# Patient Record
Sex: FEMALE | Race: WHITE | Employment: UNEMPLOYED | URBAN - METROPOLITAN AREA
[De-identification: names, ages, dates, MRNs, and addresses within clinical notes are randomized per-mention and may not be internally consistent; named-entity substitution may affect disease eponyms.]

---

## 2017-01-19 PROBLEM — N39.0 URINARY TRACT INFECTION WITHOUT HEMATURIA: Status: ACTIVE | Noted: 2017-01-19

## 2017-03-08 PROBLEM — Q64.79 STRUCTURAL ABNORMALITY OF BLADDER: Status: ACTIVE | Noted: 2017-03-08

## 2017-03-08 PROBLEM — N94.9 VAGINAL DISCOMFORT: Status: ACTIVE | Noted: 2017-03-08

## 2017-08-30 PROBLEM — N39.0 RECURRENT UTI: Status: ACTIVE | Noted: 2017-08-30

## 2019-03-06 PROBLEM — F02.80 LATE ONSET ALZHEIMER'S DISEASE WITHOUT BEHAVIORAL DISTURBANCE (HCC): Status: ACTIVE | Noted: 2017-05-22

## 2019-03-06 PROBLEM — G30.1 LATE ONSET ALZHEIMER'S DISEASE WITHOUT BEHAVIORAL DISTURBANCE (HCC): Status: ACTIVE | Noted: 2017-05-22

## 2019-03-06 PROBLEM — K21.9 GERD (GASTROESOPHAGEAL REFLUX DISEASE): Status: ACTIVE | Noted: 2019-03-06

## 2019-03-06 PROBLEM — I50.32 CHRONIC DIASTOLIC (CONGESTIVE) HEART FAILURE (HCC): Status: ACTIVE | Noted: 2019-02-04

## 2019-03-06 PROBLEM — R26.9 GAIT ABNORMALITY: Status: ACTIVE | Noted: 2019-02-25

## 2022-03-18 PROBLEM — I50.32 CHRONIC DIASTOLIC (CONGESTIVE) HEART FAILURE (HCC): Status: ACTIVE | Noted: 2019-02-04

## 2022-03-18 PROBLEM — Q64.79 STRUCTURAL ABNORMALITY OF BLADDER: Status: ACTIVE | Noted: 2017-03-08

## 2022-03-18 PROBLEM — G30.1 LATE ONSET ALZHEIMER'S DISEASE WITHOUT BEHAVIORAL DISTURBANCE (HCC): Status: ACTIVE | Noted: 2017-05-22

## 2022-03-18 PROBLEM — F02.80 LATE ONSET ALZHEIMER'S DISEASE WITHOUT BEHAVIORAL DISTURBANCE (HCC): Status: ACTIVE | Noted: 2017-05-22

## 2022-03-18 PROBLEM — K21.9 GERD (GASTROESOPHAGEAL REFLUX DISEASE): Status: ACTIVE | Noted: 2019-03-06

## 2022-03-18 PROBLEM — N39.0 URINARY TRACT INFECTION WITHOUT HEMATURIA: Status: ACTIVE | Noted: 2017-01-19

## 2022-03-19 PROBLEM — R26.9 GAIT ABNORMALITY: Status: ACTIVE | Noted: 2019-02-25

## 2022-03-19 PROBLEM — N39.0 RECURRENT UTI: Status: ACTIVE | Noted: 2017-08-30

## 2022-03-20 PROBLEM — N94.9 VAGINAL DISCOMFORT: Status: ACTIVE | Noted: 2017-03-08

## 2022-05-28 ENCOUNTER — HOSPITAL ENCOUNTER (INPATIENT)
Age: 87
LOS: 4 days | Discharge: NURSING, REHAB OR LONG-TERM CARE | DRG: 377 | End: 2022-06-03
Attending: EMERGENCY MEDICINE | Admitting: STUDENT IN AN ORGANIZED HEALTH CARE EDUCATION/TRAINING PROGRAM
Payer: MEDICARE

## 2022-05-28 ENCOUNTER — HOSPITAL ENCOUNTER (EMERGENCY)
Age: 87
Discharge: NURSING, REHAB OR LONG-TERM CARE | DRG: 377 | End: 2022-05-28
Attending: EMERGENCY MEDICINE
Payer: MEDICARE

## 2022-05-28 ENCOUNTER — APPOINTMENT (OUTPATIENT)
Dept: GENERAL RADIOLOGY | Age: 87
DRG: 377 | End: 2022-05-28
Attending: EMERGENCY MEDICINE
Payer: MEDICARE

## 2022-05-28 VITALS
TEMPERATURE: 96.6 F | WEIGHT: 196.87 LBS | RESPIRATION RATE: 24 BRPM | OXYGEN SATURATION: 98 % | BODY MASS INDEX: 31.78 KG/M2 | DIASTOLIC BLOOD PRESSURE: 77 MMHG | SYSTOLIC BLOOD PRESSURE: 160 MMHG | HEART RATE: 92 BPM

## 2022-05-28 DIAGNOSIS — K92.2 GASTROINTESTINAL HEMORRHAGE, UNSPECIFIED GASTROINTESTINAL HEMORRHAGE TYPE: Primary | ICD-10-CM

## 2022-05-28 DIAGNOSIS — D64.9 ANEMIA, UNSPECIFIED TYPE: ICD-10-CM

## 2022-05-28 DIAGNOSIS — Z79.4 TYPE 2 DIABETES MELLITUS WITH HYPERGLYCEMIA, WITH LONG-TERM CURRENT USE OF INSULIN (HCC): ICD-10-CM

## 2022-05-28 DIAGNOSIS — E11.65 TYPE 2 DIABETES MELLITUS WITH HYPERGLYCEMIA, WITH LONG-TERM CURRENT USE OF INSULIN (HCC): ICD-10-CM

## 2022-05-28 LAB
ABO + RH BLD: NORMAL
ABO + RH BLD: NORMAL
ALBUMIN SERPL-MCNC: 1.9 G/DL (ref 3.5–5)
ALBUMIN SERPL-MCNC: 1.9 G/DL (ref 3.5–5)
ALBUMIN/GLOB SERPL: 0.4 {RATIO} (ref 1.1–2.2)
ALBUMIN/GLOB SERPL: 0.5 {RATIO} (ref 1.1–2.2)
ALP SERPL-CCNC: 85 U/L (ref 45–117)
ALP SERPL-CCNC: 88 U/L (ref 45–117)
ALT SERPL-CCNC: 17 U/L (ref 12–78)
ALT SERPL-CCNC: 18 U/L (ref 12–78)
ANION GAP SERPL CALC-SCNC: 10 MMOL/L (ref 5–15)
ANION GAP SERPL CALC-SCNC: 9 MMOL/L (ref 5–15)
APTT PPP: 26.5 SEC (ref 22.1–31)
AST SERPL-CCNC: 16 U/L (ref 15–37)
AST SERPL-CCNC: 16 U/L (ref 15–37)
BASOPHILS # BLD: 0 K/UL (ref 0–0.1)
BASOPHILS # BLD: 0 K/UL (ref 0–0.1)
BASOPHILS NFR BLD: 0 % (ref 0–1)
BASOPHILS NFR BLD: 0 % (ref 0–1)
BILIRUB SERPL-MCNC: 1.2 MG/DL (ref 0.2–1)
BILIRUB SERPL-MCNC: 1.2 MG/DL (ref 0.2–1)
BLOOD GROUP ANTIBODIES SERPL: NORMAL
BLOOD GROUP ANTIBODIES SERPL: NORMAL
BUN SERPL-MCNC: 56 MG/DL (ref 6–20)
BUN SERPL-MCNC: 61 MG/DL (ref 6–20)
BUN/CREAT SERPL: 37 (ref 12–20)
BUN/CREAT SERPL: 37 (ref 12–20)
CALCIUM SERPL-MCNC: 8.6 MG/DL (ref 8.5–10.1)
CALCIUM SERPL-MCNC: 8.6 MG/DL (ref 8.5–10.1)
CALCULATED R AXIS, ECG10: 1 DEGREES
CALCULATED T AXIS, ECG11: -78 DEGREES
CHLORIDE SERPL-SCNC: 106 MMOL/L (ref 97–108)
CHLORIDE SERPL-SCNC: 107 MMOL/L (ref 97–108)
CO2 SERPL-SCNC: 24 MMOL/L (ref 21–32)
CO2 SERPL-SCNC: 24 MMOL/L (ref 21–32)
COMMENT, HOLDF: NORMAL
COMMENT, HOLDF: NORMAL
CREAT SERPL-MCNC: 1.5 MG/DL (ref 0.55–1.02)
CREAT SERPL-MCNC: 1.65 MG/DL (ref 0.55–1.02)
DIAGNOSIS, 93000: NORMAL
DIFFERENTIAL METHOD BLD: ABNORMAL
DIFFERENTIAL METHOD BLD: ABNORMAL
EOSINOPHIL # BLD: 0 K/UL (ref 0–0.4)
EOSINOPHIL # BLD: 0 K/UL (ref 0–0.4)
EOSINOPHIL NFR BLD: 0 % (ref 0–7)
EOSINOPHIL NFR BLD: 0 % (ref 0–7)
ERYTHROCYTE [DISTWIDTH] IN BLOOD BY AUTOMATED COUNT: 15.2 % (ref 11.5–14.5)
ERYTHROCYTE [DISTWIDTH] IN BLOOD BY AUTOMATED COUNT: 15.3 % (ref 11.5–14.5)
ERYTHROCYTE [DISTWIDTH] IN BLOOD BY AUTOMATED COUNT: 15.3 % (ref 11.5–14.5)
GLOBULIN SER CALC-MCNC: 4 G/DL (ref 2–4)
GLOBULIN SER CALC-MCNC: 4.5 G/DL (ref 2–4)
GLUCOSE BLD STRIP.AUTO-MCNC: 206 MG/DL (ref 65–117)
GLUCOSE SERPL-MCNC: 132 MG/DL (ref 65–100)
GLUCOSE SERPL-MCNC: 211 MG/DL (ref 65–100)
HCT VFR BLD AUTO: 29.2 % (ref 35–47)
HCT VFR BLD AUTO: 30 % (ref 35–47)
HCT VFR BLD AUTO: 30.4 % (ref 35–47)
HGB BLD-MCNC: 9 G/DL (ref 11.5–16)
HGB BLD-MCNC: 9.5 G/DL (ref 11.5–16)
HGB BLD-MCNC: 9.6 G/DL (ref 11.5–16)
IMM GRANULOCYTES # BLD AUTO: 0.2 K/UL (ref 0–0.04)
IMM GRANULOCYTES # BLD AUTO: 0.2 K/UL (ref 0–0.04)
IMM GRANULOCYTES NFR BLD AUTO: 1 % (ref 0–0.5)
IMM GRANULOCYTES NFR BLD AUTO: 1 % (ref 0–0.5)
INR PPP: 1.2 (ref 0.9–1.1)
INR PPP: 1.2 (ref 0.9–1.1)
LYMPHOCYTES # BLD: 0.5 K/UL (ref 0.8–3.5)
LYMPHOCYTES # BLD: 0.6 K/UL (ref 0.8–3.5)
LYMPHOCYTES NFR BLD: 3 % (ref 12–49)
LYMPHOCYTES NFR BLD: 4 % (ref 12–49)
MAGNESIUM SERPL-MCNC: 1.7 MG/DL (ref 1.6–2.4)
MCH RBC QN AUTO: 28 PG (ref 26–34)
MCH RBC QN AUTO: 28.5 PG (ref 26–34)
MCH RBC QN AUTO: 28.6 PG (ref 26–34)
MCHC RBC AUTO-ENTMCNC: 30.8 G/DL (ref 30–36.5)
MCHC RBC AUTO-ENTMCNC: 31.6 G/DL (ref 30–36.5)
MCHC RBC AUTO-ENTMCNC: 31.7 G/DL (ref 30–36.5)
MCV RBC AUTO: 90.1 FL (ref 80–99)
MCV RBC AUTO: 90.5 FL (ref 80–99)
MCV RBC AUTO: 90.7 FL (ref 80–99)
MONOCYTES # BLD: 0.8 K/UL (ref 0–1)
MONOCYTES # BLD: 0.9 K/UL (ref 0–1)
MONOCYTES NFR BLD: 5 % (ref 5–13)
MONOCYTES NFR BLD: 6 % (ref 5–13)
NEUTS SEG # BLD: 13.4 K/UL (ref 1.8–8)
NEUTS SEG # BLD: 14.2 K/UL (ref 1.8–8)
NEUTS SEG NFR BLD: 90 % (ref 32–75)
NEUTS SEG NFR BLD: 90 % (ref 32–75)
NRBC # BLD: 0.02 K/UL (ref 0–0.01)
NRBC # BLD: 0.02 K/UL (ref 0–0.01)
NRBC # BLD: 0.04 K/UL (ref 0–0.01)
NRBC BLD-RTO: 0.1 PER 100 WBC
NRBC BLD-RTO: 0.1 PER 100 WBC
NRBC BLD-RTO: 0.3 PER 100 WBC
PLATELET # BLD AUTO: 570 K/UL (ref 150–400)
PLATELET # BLD AUTO: 626 K/UL (ref 150–400)
PLATELET # BLD AUTO: 658 K/UL (ref 150–400)
PLATELET COMMENTS,PCOM: ABNORMAL
PMV BLD AUTO: 10.2 FL (ref 8.9–12.9)
PMV BLD AUTO: 10.3 FL (ref 8.9–12.9)
PMV BLD AUTO: 10.3 FL (ref 8.9–12.9)
POTASSIUM SERPL-SCNC: 3.2 MMOL/L (ref 3.5–5.1)
POTASSIUM SERPL-SCNC: 3.3 MMOL/L (ref 3.5–5.1)
PROT SERPL-MCNC: 5.9 G/DL (ref 6.4–8.2)
PROT SERPL-MCNC: 6.4 G/DL (ref 6.4–8.2)
PROTHROMBIN TIME: 12.1 SEC (ref 9–11.1)
PROTHROMBIN TIME: 12.4 SEC (ref 9–11.1)
Q-T INTERVAL, ECG07: 382 MS
QRS DURATION, ECG06: 86 MS
QTC CALCULATION (BEZET), ECG08: 472 MS
RBC # BLD AUTO: 3.22 M/UL (ref 3.8–5.2)
RBC # BLD AUTO: 3.33 M/UL (ref 3.8–5.2)
RBC # BLD AUTO: 3.36 M/UL (ref 3.8–5.2)
RBC MORPH BLD: ABNORMAL
SAMPLES BEING HELD,HOLD: NORMAL
SAMPLES BEING HELD,HOLD: NORMAL
SERVICE CMNT-IMP: ABNORMAL
SODIUM SERPL-SCNC: 139 MMOL/L (ref 136–145)
SODIUM SERPL-SCNC: 141 MMOL/L (ref 136–145)
SPECIMEN EXP DATE BLD: NORMAL
SPECIMEN EXP DATE BLD: NORMAL
THERAPEUTIC RANGE,PTTT: NORMAL SECS (ref 58–77)
VANCOMYCIN SERPL-MCNC: 8.1 UG/ML
VENTRICULAR RATE, ECG03: 92 BPM
WBC # BLD AUTO: 15 K/UL (ref 3.6–11)
WBC # BLD AUTO: 15.8 K/UL (ref 3.6–11)
WBC # BLD AUTO: 15.9 K/UL (ref 3.6–11)

## 2022-05-28 PROCEDURE — 80053 COMPREHEN METABOLIC PANEL: CPT

## 2022-05-28 PROCEDURE — 85027 COMPLETE CBC AUTOMATED: CPT

## 2022-05-28 PROCEDURE — 82962 GLUCOSE BLOOD TEST: CPT

## 2022-05-28 PROCEDURE — G0378 HOSPITAL OBSERVATION PER HR: HCPCS

## 2022-05-28 PROCEDURE — 99285 EMERGENCY DEPT VISIT HI MDM: CPT

## 2022-05-28 PROCEDURE — 96374 THER/PROPH/DIAG INJ IV PUSH: CPT

## 2022-05-28 PROCEDURE — 85610 PROTHROMBIN TIME: CPT

## 2022-05-28 PROCEDURE — 83735 ASSAY OF MAGNESIUM: CPT

## 2022-05-28 PROCEDURE — 86900 BLOOD TYPING SEROLOGIC ABO: CPT

## 2022-05-28 PROCEDURE — 93005 ELECTROCARDIOGRAM TRACING: CPT

## 2022-05-28 PROCEDURE — 85730 THROMBOPLASTIN TIME PARTIAL: CPT

## 2022-05-28 PROCEDURE — 96376 TX/PRO/DX INJ SAME DRUG ADON: CPT

## 2022-05-28 PROCEDURE — 85025 COMPLETE CBC W/AUTO DIFF WBC: CPT

## 2022-05-28 PROCEDURE — 80202 ASSAY OF VANCOMYCIN: CPT

## 2022-05-28 PROCEDURE — 36415 COLL VENOUS BLD VENIPUNCTURE: CPT

## 2022-05-28 PROCEDURE — 74011636637 HC RX REV CODE- 636/637: Performed by: STUDENT IN AN ORGANIZED HEALTH CARE EDUCATION/TRAINING PROGRAM

## 2022-05-28 PROCEDURE — 74011250636 HC RX REV CODE- 250/636: Performed by: STUDENT IN AN ORGANIZED HEALTH CARE EDUCATION/TRAINING PROGRAM

## 2022-05-28 PROCEDURE — 71045 X-RAY EXAM CHEST 1 VIEW: CPT

## 2022-05-28 RX ORDER — POLYETHYLENE GLYCOL 3350 17 G/17G
17 POWDER, FOR SOLUTION ORAL DAILY PRN
Status: DISCONTINUED | OUTPATIENT
Start: 2022-05-28 | End: 2022-06-03 | Stop reason: HOSPADM

## 2022-05-28 RX ORDER — POTASSIUM CHLORIDE 14.9 MG/ML
10 INJECTION INTRAVENOUS
Status: COMPLETED | OUTPATIENT
Start: 2022-05-28 | End: 2022-05-28

## 2022-05-28 RX ORDER — INSULIN LISPRO 100 [IU]/ML
INJECTION, SOLUTION INTRAVENOUS; SUBCUTANEOUS
Status: DISCONTINUED | OUTPATIENT
Start: 2022-05-28 | End: 2022-05-31

## 2022-05-28 RX ORDER — ACETAMINOPHEN 650 MG/1
650 SUPPOSITORY RECTAL
Status: DISCONTINUED | OUTPATIENT
Start: 2022-05-28 | End: 2022-06-03 | Stop reason: HOSPADM

## 2022-05-28 RX ORDER — ONDANSETRON 2 MG/ML
4 INJECTION INTRAMUSCULAR; INTRAVENOUS
Status: DISCONTINUED | OUTPATIENT
Start: 2022-05-28 | End: 2022-06-03 | Stop reason: HOSPADM

## 2022-05-28 RX ORDER — ACETAMINOPHEN 325 MG/1
650 TABLET ORAL
Status: DISCONTINUED | OUTPATIENT
Start: 2022-05-28 | End: 2022-06-03 | Stop reason: HOSPADM

## 2022-05-28 RX ORDER — MAGNESIUM SULFATE 100 %
4 CRYSTALS MISCELLANEOUS AS NEEDED
Status: DISCONTINUED | OUTPATIENT
Start: 2022-05-28 | End: 2022-06-03 | Stop reason: HOSPADM

## 2022-05-28 RX ORDER — SODIUM CHLORIDE 9 MG/ML
100 INJECTION, SOLUTION INTRAVENOUS CONTINUOUS
Status: DISCONTINUED | OUTPATIENT
Start: 2022-05-28 | End: 2022-06-02

## 2022-05-28 RX ORDER — SODIUM CHLORIDE 0.9 % (FLUSH) 0.9 %
5-40 SYRINGE (ML) INJECTION EVERY 8 HOURS
Status: DISCONTINUED | OUTPATIENT
Start: 2022-05-28 | End: 2022-06-03 | Stop reason: HOSPADM

## 2022-05-28 RX ORDER — HYDRALAZINE HYDROCHLORIDE 20 MG/ML
10 INJECTION INTRAMUSCULAR; INTRAVENOUS
Status: DISCONTINUED | OUTPATIENT
Start: 2022-05-28 | End: 2022-06-03 | Stop reason: HOSPADM

## 2022-05-28 RX ORDER — SODIUM CHLORIDE 0.9 % (FLUSH) 0.9 %
5-40 SYRINGE (ML) INJECTION AS NEEDED
Status: DISCONTINUED | OUTPATIENT
Start: 2022-05-28 | End: 2022-06-03 | Stop reason: HOSPADM

## 2022-05-28 RX ORDER — ONDANSETRON 4 MG/1
4 TABLET, ORALLY DISINTEGRATING ORAL
Status: DISCONTINUED | OUTPATIENT
Start: 2022-05-28 | End: 2022-06-03 | Stop reason: HOSPADM

## 2022-05-28 RX ADMIN — POTASSIUM CHLORIDE 10 MEQ: 14.9 INJECTION, SOLUTION INTRAVENOUS at 21:42

## 2022-05-28 RX ADMIN — SODIUM CHLORIDE 50 ML/HR: 9 INJECTION, SOLUTION INTRAVENOUS at 21:39

## 2022-05-28 RX ADMIN — Medication 2 UNITS: at 22:28

## 2022-05-28 RX ADMIN — POTASSIUM CHLORIDE 10 MEQ: 14.9 INJECTION, SOLUTION INTRAVENOUS at 20:22

## 2022-05-28 NOTE — ED TRIAGE NOTES
Pt returns to ED from [facility] after staff at facility said \"she had blood in her depends when she came back and that is what we sent her to the hospital for initially\".

## 2022-05-28 NOTE — DISCHARGE INSTRUCTIONS
You were seen today in the ER for an episode of rectal bleeding. Your hemoglobin level came back unremarkable at 10.5. A repeat hemoglobin level was 10.6. No signs of any active bleeding at this time. There are no Wet Read(s) to document.

## 2022-05-28 NOTE — PROGRESS NOTES
CM received call to set up stretcher transportation for patient to return to [facility]. [facility] ([facility] phone [phone]    Phoned [facility] when given ETA 1600. They do NOT have any earlier trips and have attempted to place calls with other companies. Writer called [facility]  [phone] Cherrie Barrera, they have availability at 1400. CM accepted the 1400 crew, [facility].     Luanne Hodgkin, RN, BSN, [facility]  ED Care Management  [phone]

## 2022-05-28 NOTE — ED TRIAGE NOTES
Patient sent from [facility] for rectal bleeding this morning. [facility] nurse reported a \"fist sized amount of coagulated blood at the patient's rectum. \" Patient is oriented to self in triage, does have diagnosis of alzheimers. Presents with bruising and dressing to right hip, patient had right hip replacement on 5/11/22 after a fall. Patient tested positive for COVID on 5/13/22.

## 2022-05-28 NOTE — ED PROVIDER NOTES
30-year-old female presents from [facility] rehab via EMS with a complaint of rectal bleeding. According to EMS, nursing home staff was changing patient this morning when he noticed a large blood clot in her diaper. No other evidence of active bleeding. Patient denies any pain at this time. She denies abdominal pain and rectal pain. No known history of GI bleed. Patient is in rehab following surgical repair of her right hip fracture and fracture to her right radius after ground-level fall. Her hospital course has been complicated by community acquired pneumonia for which she is currently on Zosyn and vancomycin as of May 25 via a PICC line in her right upper extremity. Patient also has a chronically indwelling Martin catheter. Her past medical history significant for CHF, diabetes, arthritis, recurrent UTIs, indwelling Martin catheter, dementia. According to records from [facility], patient takes a daily aspirin and is also on DVT prophylaxis with Lovenox. No other blood thinning medications noted. On further review of her records, she had a hemoglobin of 8.9 on 5/25. Past Medical History:   Diagnosis Date    Anxiety     CAD (coronary artery disease)     Diabetes mellitus ([facility] [address]     Dysuria     H/O joint replacement     HTN (hypertension)     Hypercholesteremia     Osteoarthritis     Recurrent UTI     Structural abnormality of bladder     Urinary tract infection without hematuria     site unspecified    UTI (urinary tract infection)        Past Surgical History:   Procedure Laterality Date    HX APPENDECTOMY      HX BACK SURGERY      HX CAROTID ENDARTERECTOMY      HX CATARACT REMOVAL      HX FEMUR FRACTURE TX      HX KNEE REPLACEMENT           History reviewed. No pertinent family history.     Social History     Socioeconomic History    Marital status:      Spouse name: Not on file    Number of children: Not on file    Years of education: Not on file    Highest education level: Not on file   Occupational History    Not on file   Tobacco Use    Smoking status: Never Smoker    Smokeless tobacco: Never Used   Vaping Use    Vaping Use: Never used   Substance and Sexual Activity    Alcohol use: No    Drug use: No    Sexual activity: Not on file   Other Topics Concern    Not on file   Social History Narrative    Not on file     Social Determinants of Health     Financial Resource Strain:     Difficulty of Paying Living Expenses: Not on file   Food Insecurity:     Worried About Running Out of Food in the Last Year: Not on file    Galo of Food in the Last Year: Not on file   Transportation Needs:     Lack of Transportation (Medical): Not on file    Lack of Transportation (Non-Medical): Not on file   Physical Activity:     Days of Exercise per Week: Not on file    Minutes of Exercise per Session: Not on file   Stress:     Feeling of Stress : Not on file   Social Connections:     Frequency of Communication with Friends and Family: Not on file    Frequency of Social Gatherings with Friends and Family: Not on file    Attends Baptism Services: Not on file    Active Member of [address] [place] [address] or Organizations: Not on file    Attends Club or Organization Meetings: Not on file    Marital Status: Not on file   Intimate Partner Violence:     Fear of Current or Ex-Partner: Not on file    Emotionally Abused: Not on file    Physically Abused: Not on file    Sexually Abused: Not on file   Housing Stability:     Unable to Pay for Housing in the Last Year: Not on file    Number of [place] in the Last Year: Not on file    Unstable Housing in the Last Year: Not on file         ALLERGIES: Latex and Iodinated contrast media    Review of Systems   Unable to perform ROS: Dementia       Vitals:    05/28/22 0737   Weight: 89.3 kg (196 lb 13.9 oz)            Physical Exam  Vitals and nursing note reviewed. Constitutional:       General: She is not in acute distress. Appearance: She is well-developed. Comments: Appears chronically debilitated. HENT:      Head: Normocephalic and atraumatic. Eyes:      General: No scleral icterus. Conjunctiva/sclera: Conjunctivae normal.      Pupils: Pupils are equal, round, and reactive to light. Cardiovascular:      Rate and Rhythm: Normal rate. Heart sounds: No murmur heard. Pulmonary:      Effort: Pulmonary effort is normal. No respiratory distress. Abdominal:      General: There is no distension. Genitourinary:     Comments: Martin catheter in place. Musculoskeletal:         General: Normal range of motion. Cervical back: Normal range of motion and neck supple. Comments: Healing surgical wound to the right hip with large ecchymosis extending down the back of the right thigh. Single port PICC line in the right upper extremity. Skin:     General: Skin is warm and dry. Findings: No rash. Neurological:      Mental Status: She is alert and oriented to person, place, and time. MDM  Number of Diagnoses or Management Options  Gastrointestinal hemorrhage, unspecified gastrointestinal hemorrhage type  Diagnosis management comments: Assessment: Patient was sent here for single episode of rectal bleeding. Vital signs are found to be stable with an elevated blood pressure. She had no further episodes of bleeding while in the emergency department. Her hemoglobin level came back at 9.5 which represented an improvement from her previous level as reported by the nursing home. A repeat hemoglobin was obtained in the ED which came back at 9.6. No signs of any significant bleeding at this time. And the patient is stable for discharge back to the nursing home to continue her routine care.        Amount and/or Complexity of Data Reviewed  Clinical lab tests: reviewed  Tests in the radiology section of CPT®: reviewed  Tests in the medicine section of CPT®: reviewed      ED Course as of 05/28/22 1516 Sat May 28, 2022   0920 EKG, 12 LEAD, INITIAL  ED EKG interpretation:  Rhythm: normal sinus rhythm. Rate (approx.): 92. Axis: normal.  ST segment:  No concerning ST elevations or depressions. This EKG was interpreted by Yunior Villalobos MD,ED Provider.      [JM]      ED Course User Index  [JM] Zunilda Amaro MD       Procedures

## 2022-05-28 NOTE — H&P
[facility] Adult  Hospitalist Group  History and Physical    Primary Care Provider: Nettie Broderick MD  Date of Service:  5/28/2022    Chief Complaint: Blood clots in diaper    Subjective:     Ash Dang is a 82 y.o. female with past medical history of anxiety, Alzheimer's dementia, hypertension, recent hip replacement right hip on 5/12/2022 at [facility], diabetes, UTI, being treated for pneumonia through a PICC line with vancomycin and Zosyn at inpatient rehab. Patient comes from [facility] rehab. She was here earlier this morning when they found her to have a substantially sized blood clot in her diaper. She was sent from [facility] due to concern for GI bleed. While here in the ER patient was hemodynamically stable and there were no signs of bleeding so she was monitored and sent back to rehab. Apparently later in the evening the patient presented with another blood clot in her diaper, so the people at [facility] sent her back due to concerns. Her hemoglobin has dropped from about 9.5 to about 9 since the morning. Patient is minimally participative and responsive which is near her baseline from her dementia, but family says that she is more participative usually. They do report that she was kept at [facility] for about 2 weeks and required 2 different transfusions while there. They also report that when she was discharged from [facility] she was discharged without a blood thinner but that she had been started on a blood thinner for apparent DVT prophylaxis at [facility] I am unable to get a review of systems from the patient due to her not responding to my questions. In the ER the patient has been hemodynamically stable, with chest x-ray redemonstrating pneumonia, and no signs of bleeding at this time. I spoke to her family on the telephone and they deny any known issues with fevers, complaints of chest pains, diarrhea, or any other symptoms aside from the bleeding.   She has not eaten anything since the morning. Review of Systems:    Unable to obtain accurate review of systems due to patient's functional status. Past Medical History:   Diagnosis Date    Anxiety     CAD (coronary artery disease)     Diabetes mellitus ([address]     Dysuria     H/O joint replacement     HTN (hypertension)     Hypercholesteremia     Osteoarthritis     Recurrent UTI     Structural abnormality of bladder     Urinary tract infection without hematuria     site unspecified    UTI (urinary tract infection)       Past Surgical History:   Procedure Laterality Date    HX APPENDECTOMY      HX BACK SURGERY      HX CAROTID ENDARTERECTOMY      HX CATARACT REMOVAL      HX FEMUR FRACTURE TX      HX KNEE REPLACEMENT       Prior to Admission medications    Medication Sig Start Date End Date Taking? Authorizing Provider   metFORMIN (GLUCOPHAGE) 500 mg tablet  7/4/18   Provider, Historical   rivastigmine tartrate (EXELON) 6 mg capsule 6 mg. 1/20/17   Provider, Historical   acetaminophen (TYLENOL) 325 mg tablet 650 mg. 10/18/16   Provider, Historical   amLODIPine (NORVASC) 10 mg tablet TAKE ONE TABLET BY MOUTH ONCE DAILY 2/23/16   Provider, Historical   aspirin delayed-release 81 mg tablet 81 mg.    Provider, Historical   atorvastatin (LIPITOR) 10 mg tablet TAKE ONE TABLET BY MOUTH EVERY NIGHT AT BEDTIME 12/28/15   Provider, Historical   carvedilol (COREG) 25 mg tablet TAKE ONE-HALF TABLET BY MOUTH TWICE DAILY 4/11/16   Provider, Historical   doxepin (SINEQUAN) 25 mg capsule 25 mg. 4/11/16   Provider, Historical   hydrALAZINE (APRESOLINE) 50 mg tablet 50 mg. 10/18/16   Provider, Historical   Insulin Lisp & Lisp Prot, Hum, (HUMALOG MIX 75-25 KWIKPEN) 100 unit/mL (75-25) inpn 10 Units. 3/23/16   Provider, Historical   omeprazole (PRILOSEC) 20 mg capsule 20 mg. 10/18/16   Provider, Historical   oxyCODONE-acetaminophen (PERCOCET) 5-325 mg per tablet Take 2 Tabs by Mouth Every 4 Hours As Needed for Pain. 10/18/16   Provider, Historical     Allergies   Allergen Reactions    Latex Unknown (comments) and Itching     Other reaction(s): itching    Iodinated Contrast Media Unknown (comments) and Hives      No family history on file. SOCIAL HISTORY:  Patient resides at inpatient rehab at this time. Patient ambulates with no ambulation. Smoking history: no  Alcohol history: no        Objective:       Physical Exam:   Physical Exam  Vitals reviewed. Constitutional:       General: She is not in acute distress. Appearance: She is not ill-appearing, toxic-appearing or diaphoretic. HENT:      Head: Normocephalic and atraumatic. Nose: Nose normal. No congestion or rhinorrhea. Mouth/Throat:      Mouth: Mucous membranes are moist.      Pharynx: Oropharynx is clear. No oropharyngeal exudate or posterior oropharyngeal erythema. Eyes:      General: No scleral icterus. Extraocular Movements: Extraocular movements intact. Pupils: Pupils are equal, round, and reactive to light. Cardiovascular:      Rate and Rhythm: Normal rate and regular rhythm. Pulses: Normal pulses. Heart sounds: Normal heart sounds. No murmur heard. No friction rub. No gallop. Pulmonary:      Effort: Pulmonary effort is normal. No respiratory distress. Breath sounds: No stridor. Rales (Diffuse) present. No wheezing or rhonchi. Abdominal:      General: Bowel sounds are normal. There is no distension. Palpations: Abdomen is soft. There is no mass. Tenderness: There is no abdominal tenderness. There is no guarding or rebound. Hernia: No hernia is present. Musculoskeletal:         General: Signs of injury (Right hip surgical area bandaged. Left lower extremity ecchymoses. ) present. Normal range of motion. Cervical back: Neck supple. No tenderness. Right lower leg: No edema. Left lower leg: No edema. Skin:     General: Skin is warm and dry.       Capillary Refill: Capillary refill takes less than 2 seconds. Coloration: Skin is pale. Skin is not jaundiced. Findings: Bruising present. No erythema, lesion or rash. Neurological:      Mental Status: She is disoriented. Motor: Weakness present. ECG: Atrial fibrillation    Data Review: All diagnostic labs and studies have been reviewed. Chest x-ray Pulmonary opacification. Assessment:     Active Problems:    GIB (gastrointestinal bleeding) (5/28/2022)    Apparent JOSE F, unknown if CKD    Pneumonia with right sided PICC    Alzheimer's dementia    Diabetes    Hypertension    Hypokalemia    Plan:       GIB (gastrointestinal bleeding) (5/28/2022)  H&H every 8 hours  Remote telemetry  N.p.o.  Hemodynamically stable at this time  Monitor vitals and labs  Transfuse if hemoglobin less than 7  I spoke with the family on the telephone and they do not want her to go through a colonoscopy or any other procedure that would be traumatic  If active bleeding, consider CTA  Reevaluate based on hemoglobin measurements  At this time I am not putting her on any blood thinner due to bleeding, will reevaluate in the morning. Anemia secondary to blood loss  As above    Apparent JOSE F, unknown if CKD  Gentle hydration  Check labs in the morning    Pneumonia with right sided PICC  Patient was being treated with vancomycin and Zosyn  Continue antibiotic treatment  Monitor    Alzheimer's dementia  Stable    Diabetes  Sliding scale insulin    Hypertension  Hydralazine IV as needed for blood pressure above 160 for now as patient is n.p.o. Hypokalemia  Giving IV potassium 20 mEq at this time  Recheck in the morning    FUNCTIONAL STATUS PRIOR TO HOSPITALIZATION and rehab due to hip fracture. Does not ambulate. No falls.       Signed By: Sunni Gibbs MD     May 28, 2022

## 2022-05-28 NOTE — ED NOTES
Patient came with a walls in from [facility]. Walls was not secured well and was not draining well. Will D/C walls and replace with same size 16 [place]. Pt cleansed; barrier cream applied to pt perineum area.

## 2022-05-28 NOTE — ED NOTES
[facility] at bedside for transport back to [facility]. SBAR given. Discharge papers given to [facility].

## 2022-05-28 NOTE — ED PROVIDER NOTES
The history is provided by medical records. The history is limited by the condition of the patient. Rectal Bleeding   This is a recurrent problem. The current episode started 6 to 12 hours ago. Pertinent negatives include no abdominal pain, no dysuria, no chills, no fever, no nausea, no back pain, no diarrhea and no constipation. She has tried nothing for the symptoms. The treatment provided no relief. Past Medical History:   Diagnosis Date    Anxiety     CAD (coronary artery disease)     Diabetes mellitus ([address]     Dysuria     H/O joint replacement     HTN (hypertension)     Hypercholesteremia     Osteoarthritis     Recurrent UTI     Structural abnormality of bladder     Urinary tract infection without hematuria     site unspecified    UTI (urinary tract infection)        Past Surgical History:   Procedure Laterality Date    HX APPENDECTOMY      HX BACK SURGERY      HX CAROTID ENDARTERECTOMY      HX CATARACT REMOVAL      HX FEMUR FRACTURE TX      HX KNEE REPLACEMENT           No family history on file. Social History     Socioeconomic History    Marital status:      Spouse name: Not on file    Number of children: Not on file    Years of education: Not on file    Highest education level: Not on file   Occupational History    Not on file   Tobacco Use    Smoking status: Never Smoker    Smokeless tobacco: Never Used   Vaping Use    Vaping Use: Never used   Substance and Sexual Activity    Alcohol use: No    Drug use: No    Sexual activity: Not on file   Other Topics Concern    Not on file   Social History Narrative    Not on file     Social Determinants of Health     Financial Resource Strain:     Difficulty of Paying Living Expenses: Not on file   Food Insecurity:     Worried About [address] in the Last Year: Not on file    Galo of Food in the Last Year: Not on file   Transportation Needs:     Lack of Transportation (Medical):  Not on file    Lack of Transportation (Non-Medical): Not on file   Physical Activity:     Days of Exercise per Week: Not on file    Minutes of Exercise per Session: Not on file   Stress:     Feeling of Stress : Not on file   Social Connections:     Frequency of Communication with Friends and Family: Not on file    Frequency of Social Gatherings with Friends and Family: Not on file    Attends Faith Services: Not on file    Active Member of [address] [place] [address] or Organizations: Not on file    Attends Club or Organization Meetings: Not on file    Marital Status: Not on file   Intimate Partner Violence:     Fear of Current or Ex-Partner: Not on file    Emotionally Abused: Not on file    Physically Abused: Not on file    Sexually Abused: Not on file   Housing Stability:     Unable to Pay for Housing in the Last Year: Not on file    Number of [place] in the Last Year: Not on file    Unstable Housing in the Last Year: Not on file         ALLERGIES: Latex and Iodinated contrast media    Review of Systems   Constitutional: Negative for activity change, chills and fever. HENT: Negative for nosebleeds, sore throat, trouble swallowing and voice change. Eyes: Negative for visual disturbance. Respiratory: Negative for shortness of breath. Cardiovascular: Negative for chest pain and palpitations. Gastrointestinal: Positive for anal bleeding. Negative for abdominal pain, constipation, diarrhea and nausea. Genitourinary: Negative for difficulty urinating, dysuria, hematuria and urgency. Musculoskeletal: Negative for back pain, neck pain and neck stiffness. Skin: Negative for color change. Allergic/Immunologic: Negative for immunocompromised state. Neurological: Negative for dizziness, seizures, syncope, weakness, light-headedness, numbness and headaches. Psychiatric/Behavioral: Negative for behavioral problems, confusion, hallucinations, self-injury and suicidal ideas.        Vitals:    05/28/22 1617   Temp: (!) (P) 96.6 °F (35.9 °C)            Physical Exam  Vitals and nursing note reviewed. Exam conducted with a chaperone present. Constitutional:       General: She is not in acute distress. Appearance: She is well-developed. She is not diaphoretic. HENT:      Head: Atraumatic. Neck:      Trachea: No tracheal deviation. Cardiovascular:      Comments: Warm and well perfused  Pulmonary:      Effort: Pulmonary effort is normal. No respiratory distress. Musculoskeletal:         General: Normal range of motion. Skin:     General: Skin is warm and dry. Neurological:      Mental Status: She is alert. Mental status is at baseline. Coordination: Coordination normal.          MDM     This is an 81-year-old female with past medical history, review of systems, physical exam as above, presenting with complaints of rectal bleeding. Patient recently underwent right hip replacement, recovering at a local nursing home. She is reportedly taking aspirin, as well as Lovenox for DVT prophylaxis. She was sent from her nursing home after they noted during a diaper change this morning that she had a blood clot, her hemoglobin was unchanged, and after discussions with her family she was referred back to her long-term care facility. Apparently later today further blood clots were noted, and she was sent back to the emergency department. Patient is noted to have a history of community-acquired pneumonia, with right upper extremity PICC line, indwelling Martin catheter, and history of dementia. Upon arrival she is awake, reportedly to be at baseline and unable to contribute to review of systems. She does have a large blood clot in her diaper, without active bleeding. We will reevaluate lab work, and I have attempted to reach her son by phone, with no answer, I have left a message. We will reassess, and make a disposition.     Procedures    Perfect Serve Consult for Admission  6:25 PM    ED Room Number: XM19/87  Patient Name and age:  Micah Singh 82 y.o.  female  Working Diagnosis:   1. Gastrointestinal hemorrhage, unspecified gastrointestinal hemorrhage type    2.  Anemia, unspecified type        COVID-19 Suspicion:  no  Sepsis present:  no  Reassessment needed: no  Code Status:  Do Not Resuscitate  Readmission: no  Isolation Requirements:  no  Recommended Level of Care:  med/surg  Department:[facility] Adult ED - 21   Other:  D/w Dr. Ashkan Ryan

## 2022-05-29 LAB
ALBUMIN SERPL-MCNC: 1.7 G/DL (ref 3.5–5)
ALBUMIN/GLOB SERPL: 0.5 {RATIO} (ref 1.1–2.2)
ALP SERPL-CCNC: 83 U/L (ref 45–117)
ALT SERPL-CCNC: 14 U/L (ref 12–78)
ANION GAP SERPL CALC-SCNC: 9 MMOL/L (ref 5–15)
AST SERPL-CCNC: 15 U/L (ref 15–37)
BASOPHILS # BLD: 0 K/UL (ref 0–0.1)
BASOPHILS NFR BLD: 0 % (ref 0–1)
BILIRUB SERPL-MCNC: 1 MG/DL (ref 0.2–1)
BUN SERPL-MCNC: 53 MG/DL (ref 6–20)
BUN/CREAT SERPL: 37 (ref 12–20)
CALCIUM SERPL-MCNC: 8.1 MG/DL (ref 8.5–10.1)
CHLORIDE SERPL-SCNC: 110 MMOL/L (ref 97–108)
CO2 SERPL-SCNC: 24 MMOL/L (ref 21–32)
CREAT SERPL-MCNC: 1.44 MG/DL (ref 0.55–1.02)
DIFFERENTIAL METHOD BLD: ABNORMAL
EOSINOPHIL # BLD: 0 K/UL (ref 0–0.4)
EOSINOPHIL NFR BLD: 0 % (ref 0–7)
ERYTHROCYTE [DISTWIDTH] IN BLOOD BY AUTOMATED COUNT: 15.4 % (ref 11.5–14.5)
GLOBULIN SER CALC-MCNC: 3.7 G/DL (ref 2–4)
GLUCOSE BLD STRIP.AUTO-MCNC: 181 MG/DL (ref 65–117)
GLUCOSE BLD STRIP.AUTO-MCNC: 187 MG/DL (ref 65–117)
GLUCOSE BLD STRIP.AUTO-MCNC: 188 MG/DL (ref 65–117)
GLUCOSE BLD STRIP.AUTO-MCNC: 203 MG/DL (ref 65–117)
GLUCOSE SERPL-MCNC: 194 MG/DL (ref 65–100)
HCT VFR BLD AUTO: 25.9 % (ref 35–47)
HCT VFR BLD AUTO: 27 % (ref 35–47)
HCT VFR BLD AUTO: 27.8 % (ref 35–47)
HGB BLD-MCNC: 7.7 G/DL (ref 11.5–16)
HGB BLD-MCNC: 8.2 G/DL (ref 11.5–16)
HGB BLD-MCNC: 8.6 G/DL (ref 11.5–16)
IMM GRANULOCYTES # BLD AUTO: 0.1 K/UL (ref 0–0.04)
IMM GRANULOCYTES NFR BLD AUTO: 1 % (ref 0–0.5)
LYMPHOCYTES # BLD: 0.7 K/UL (ref 0.8–3.5)
LYMPHOCYTES NFR BLD: 5 % (ref 12–49)
MCH RBC QN AUTO: 27.7 PG (ref 26–34)
MCHC RBC AUTO-ENTMCNC: 30.4 G/DL (ref 30–36.5)
MCV RBC AUTO: 91.2 FL (ref 80–99)
MONOCYTES # BLD: 0.7 K/UL (ref 0–1)
MONOCYTES NFR BLD: 5 % (ref 5–13)
NEUTS SEG # BLD: 12.9 K/UL (ref 1.8–8)
NEUTS SEG NFR BLD: 89 % (ref 32–75)
NRBC # BLD: 0 K/UL (ref 0–0.01)
NRBC BLD-RTO: 0 PER 100 WBC
PLATELET # BLD AUTO: 508 K/UL (ref 150–400)
PMV BLD AUTO: 10.5 FL (ref 8.9–12.9)
POTASSIUM SERPL-SCNC: 3.3 MMOL/L (ref 3.5–5.1)
PROT SERPL-MCNC: 5.4 G/DL (ref 6.4–8.2)
RBC # BLD AUTO: 2.96 M/UL (ref 3.8–5.2)
RBC MORPH BLD: ABNORMAL
SERVICE CMNT-IMP: ABNORMAL
SODIUM SERPL-SCNC: 143 MMOL/L (ref 136–145)
WBC # BLD AUTO: 14.4 K/UL (ref 3.6–11)

## 2022-05-29 PROCEDURE — 74011250636 HC RX REV CODE- 250/636: Performed by: STUDENT IN AN ORGANIZED HEALTH CARE EDUCATION/TRAINING PROGRAM

## 2022-05-29 PROCEDURE — 96376 TX/PRO/DX INJ SAME DRUG ADON: CPT

## 2022-05-29 PROCEDURE — G0378 HOSPITAL OBSERVATION PER HR: HCPCS

## 2022-05-29 PROCEDURE — 74011000250 HC RX REV CODE- 250: Performed by: STUDENT IN AN ORGANIZED HEALTH CARE EDUCATION/TRAINING PROGRAM

## 2022-05-29 PROCEDURE — 85025 COMPLETE CBC W/AUTO DIFF WBC: CPT

## 2022-05-29 PROCEDURE — 82962 GLUCOSE BLOOD TEST: CPT

## 2022-05-29 PROCEDURE — 85014 HEMATOCRIT: CPT

## 2022-05-29 PROCEDURE — 74011000258 HC RX REV CODE- 258: Performed by: STUDENT IN AN ORGANIZED HEALTH CARE EDUCATION/TRAINING PROGRAM

## 2022-05-29 PROCEDURE — 85018 HEMOGLOBIN: CPT

## 2022-05-29 PROCEDURE — 96375 TX/PRO/DX INJ NEW DRUG ADDON: CPT

## 2022-05-29 PROCEDURE — 80053 COMPREHEN METABOLIC PANEL: CPT

## 2022-05-29 PROCEDURE — 74011636637 HC RX REV CODE- 636/637: Performed by: STUDENT IN AN ORGANIZED HEALTH CARE EDUCATION/TRAINING PROGRAM

## 2022-05-29 PROCEDURE — 36415 COLL VENOUS BLD VENIPUNCTURE: CPT

## 2022-05-29 RX ADMIN — VANCOMYCIN HYDROCHLORIDE 500 MG: 500 INJECTION, POWDER, LYOPHILIZED, FOR SOLUTION INTRAVENOUS at 23:09

## 2022-05-29 RX ADMIN — Medication 1 UNITS: at 22:33

## 2022-05-29 RX ADMIN — Medication 10 ML: at 22:34

## 2022-05-29 RX ADMIN — PIPERACILLIN SODIUM AND TAZOBACTAM SODIUM 3.38 G: 3; 375 INJECTION, POWDER, FOR SOLUTION INTRAVENOUS at 18:38

## 2022-05-29 RX ADMIN — Medication 10 ML: at 14:00

## 2022-05-29 RX ADMIN — PIPERACILLIN SODIUM AND TAZOBACTAM SODIUM 3.38 G: 3; 375 INJECTION, POWDER, FOR SOLUTION INTRAVENOUS at 02:29

## 2022-05-29 RX ADMIN — VANCOMYCIN HYDROCHLORIDE 500 MG: 500 INJECTION, POWDER, LYOPHILIZED, FOR SOLUTION INTRAVENOUS at 00:27

## 2022-05-29 RX ADMIN — PIPERACILLIN SODIUM AND TAZOBACTAM SODIUM 3.38 G: 3; 375 INJECTION, POWDER, FOR SOLUTION INTRAVENOUS at 10:16

## 2022-05-29 NOTE — ED NOTES
Has a final transfer review been performed? Yes     Reason for Admission: GI Bleed  Patient comes from: [facility]  Mental Status: Confused  ADL:  Ambulation:bedridden    Pertinent Info/Safety Concerns: none  COVID Status: n/a pt had COVID 5/10  MEWS Score: 1  Vitals:    05/28/22 1730 05/28/22 1830 05/28/22 1900 05/28/22 2000   BP: (!) 140/72 133/80 (!) 163/72 (!) 145/75   Pulse:       Resp:       Temp:       SpO2:       Height:    5' 6\" (1.676 m)     Transport mode:ED stretcher    TRANSFER - OUT REPORT:    Report given to [address] [place] [address] (name) on Tayler Cruz  being transferred to [address] [place] [place] [address] for routine progression of care       Report consisted of patient's Situation, Background, Assessment and   Recommendations(SBAR). Information from the following report(s) SBAR was reviewed with the receiving nurse. Lines:       Opportunity for questions and clarification was provided.

## 2022-05-29 NOTE — PROGRESS NOTES
Pharmacist Note - Vancomycin Dosing    Consult provided for this 82 y.o. female for indication of HAP. Antibiotic regimen(s): vancomycin, Zosyn  Patient on vancomycin PTA? YES, started  at [facility] regimen was 500 mg IV Q24h, last dose @ 1350    Recent Labs     22  1711 22  0949 22  0754   WBC 15.8* 15.9* 15.0*   CREA 1.65*  --  1.50*   BUN 61*  --  56*     Frequency of BMP: tomorrow AM  Height: 167.6 cm  Weight: 89.3 kg  Est CrCl: ~28 ml/min  Temp (24hrs), Av.1 °F (36.2 °C), Min:96.6 °F (35.9 °C), Max:97.7 °F (36.5 °C)    Cultures:  none    MRSA Swab ordered (if applicable)? N/A    The plan below is expected to result in a target range of Trough 10-15 mcg/mL    Will dose by levels due to poor renal function. Random level ordered to guide dosing. Will determine a maintenance regimen after level results.

## 2022-05-29 NOTE — PROGRESS NOTES
[address] [facility] Adult  Hospitalist Group                                                                                          Hospitalist Progress Note  Sid Brady MD  Answering service: [phone] from in house phone        Date of Service:  2022  NAME:  Moni Turner  :  1935  MRN:  [id number]      Admission Summary:     Moni Turner is a 82 y.o. female with past medical history of anxiety, Alzheimer's dementia, hypertension, recent hip replacement right hip on 2022 at [facility], diabetes, UTI, being treated for pneumonia through a PICC line with vancomycin and Zosyn at inpatient rehab. [facility] She was here earlier this morning when they found her to have a substantially sized blood clot in her diaper. She was sent from [facility] due to concern for GI bleed. While here in the ER patient was hemodynamically stable and there were no signs of bleeding so she was monitored and sent back to rehab. Apparently later in the evening the patient presented with another blood clot in her diaper, so the people at [facility] rehab sent her back due to concerns. Her hemoglobin has dropped from about 9.5 to about 9 since the morning. Patient is minimally participative and responsive which is near her baseline from her dementia, but family says that she is more participative usually. They do report that she was kept at [facility] for about 2 weeks and required 2 different transfusions while there. They also report that when she was discharged from [facility] she was discharged without a blood thinner but that she had been started on a blood thinner for apparent DVT prophylaxis at [facility]. I am unable to get a review of systems from the patient due to her not responding to my questions.     In the ER the patient has been hemodynamically stable, with chest x-ray redemonstrating pneumonia, and no signs of bleeding at this time.   I spoke to her family on the telephone and they deny any known issues with fevers, complaints of chest pains, diarrhea, or any other symptoms aside from the bleeding. She has not eaten anything since the morning. Interval history / Subjective:   Patient seen and examined for follow-up of GI bleed. Seen and examined earlier this morning by me. Patient is somnolent and continues to be disoriented. Apparently not completely at her baseline but she does have advanced dementia. She has been hemodynamically stable. Assessment & Plan:       GIB (gastrointestinal bleeding) (5/28/2022)  H&H every 8 hours  Remote telemetry  N.p.o.  Hemodynamically stable at this time  Monitor vitals and labs  Transfuse if hemoglobin less than 7  Hemoglobin trended down to 8.2 but now up to 8.6  Continue to monitor  Looks like bleeding is stopped    Anemia secondary to blood loss  As above     Apparent JOSE F, unknown if CKD  Gentle hydration  Check labs in the morning  Improved with gentle hydration     Pneumonia with right sided PICC  Patient was being treated with vancomycin and Zosyn  Continue antibiotic treatment  Monitor     Alzheimer's dementia  Stable     Diabetes  Sliding scale insulin     Hypertension  Hydralazine IV as needed for blood pressure above 160 for now as patient is n.p.o.     Hypokalemia  Giving IV potassium 20 mEq at this time  Recheck in the morning    We will see how much the patient improves to see if we can get her back to rehab. At this time patient appears very somnolent and very tired.     Code status: DNR  DVT prophylaxis: Held due to GIB    Care Plan discussed with: Patient/Family and Nurse  Anticipated Disposition: [facility]/Rehab  Anticipated Discharge: 24 hours to 48 hours     Hospital Problems  Date Reviewed: 3/6/2019          Codes Class Noted POA    GIB (gastrointestinal bleeding) ICD-10-CM: K92.2  ICD-9-CM: 578.9  5/28/2022 Unknown                Review of Systems:   A comprehensive review of systems was negative except for that written in the HPI. Vital Signs:    Last 24hrs VS reviewed since prior progress note. Most recent are:  Visit Vitals  BP (!) 145/74   Pulse 91   Temp 97.6 °F (36.4 °C)   Resp 18   Ht 5' 6\" (1.676 m)   Wt 82.5 kg (181 lb 14.1 oz)   SpO2 92%   BMI 29.36 kg/m²         Intake/Output Summary (Last 24 hours) at 5/29/2022 1612  Last data filed at 5/28/2022 2220  Gross per 24 hour   Intake    Output 550 ml   Net -550 ml        Physical Examination:     I had a face to face encounter with this patient and independently examined them on 5/29/2022 as outlined below:          Constitutional:  No acute distress, somnolent, wakes up to voice but does not answer questions. Pale. ENT:  Oral mucosa moist, oropharynx benign. Resp:  CTA bilaterally. No wheezing/rhonchi/rales. No accessory muscle use   CV:  Regular rhythm, normal rate, no murmurs, gallops, rubs    GI:  Soft, non distended, non tender. normoactive bowel sounds, no hepatosplenomegaly     Musculoskeletal:  No edema, warm, 2+ pulses throughout. Right hip surgical area bandaged. Neurologic:  Moves all extremities. AAOx3, CN II-XII reviewed            Data Review:    Review and/or order of clinical lab test  Review and/or order of tests in the radiology section of CPT  Review and/or order of tests in the medicine section of CPT      Labs:     Recent Labs     05/29/22  1507 05/29/22  0349 05/28/22 1711 05/28/22 1711   WBC  --  14.4*  --  15.8*   HGB 8.6* 8.2*   < > 9.0*   HCT 27.8* 27.0*   < > 29.2*   PLT  --  508*  --  570*    < > = values in this interval not displayed.      Recent Labs     05/29/22  0349 05/28/22  1711 05/28/22  0754    141 139   K 3.3* 3.2* 3.3*   * 107 106   CO2 24 24 24   BUN 53* 61* 56*   CREA 1.44* 1.65* 1.50*   * 211* 132*   CA 8.1* 8.6 8.6   MG  --   --  1.7     Recent Labs     05/29/22  0349 05/28/22  1711 05/28/22  0754   ALT 14 17 18   AP 83 85 88   TBILI 1.0 1.2* 1.2*   TP 5.4* 5.9* 6. 4   ALB 1.7* 1.9* 1.9*   GLOB 3.7 4.0 4.5*     Recent Labs     05/28/22  1711 05/28/22  0754   INR 1.2* 1.2*   PTP 12.4* 12.1*   APTT 26.5  --       No results for input(s): FE, TIBC, PSAT, FERR in the last 72 hours. No results found for: FOL, RBCF   No results for input(s): PH, PCO2, PO2 in the last 72 hours. No results for input(s): CPK, CKNDX, TROIQ in the last 72 hours. No lab exists for component: CPKMB  No results found for: CHOL, CHOLX, CHLST, CHOLV, HDL, HDLP, LDL, LDLC, DLDLP, TGLX, TRIGL, TRIGP, CHHD, CHHDX  Lab Results   Component Value Date/Time    Glucose (POC) 188 (H) 05/29/2022 04:09 PM    Glucose (POC) 181 (H) 05/29/2022 11:46 AM    Glucose (POC) 187 (H) 05/29/2022 06:02 AM    Glucose (POC) 206 (H) 05/28/2022 10:24 PM     No results found for: COLOR, APPRN, SPGRU, REFSG, KEANU, PROTU, GLUCU, KETU, BILU, UROU, DILMA, LEUKU, GLUKE, EPSU, BACTU, WBCU, RBCU, CASTS, UCRY      Medications Reviewed:     Current Facility-Administered Medications   Medication Dose Route Frequency    [START ON 5/30/2022] Vancomycin, Random - Please draw on 5/30 @ 0400. Thanks!    Other ONCE    sodium chloride (NS) flush 5-40 mL  5-40 mL IntraVENous Q8H    sodium chloride (NS) flush 5-40 mL  5-40 mL IntraVENous PRN    acetaminophen (TYLENOL) tablet 650 mg  650 mg Oral Q6H PRN    Or    acetaminophen (TYLENOL) suppository 650 mg  650 mg Rectal Q6H PRN    polyethylene glycol (MIRALAX) packet 17 g  17 g Oral DAILY PRN    ondansetron (ZOFRAN ODT) tablet 4 mg  4 mg Oral Q8H PRN    Or    ondansetron (ZOFRAN) injection 4 mg  4 mg IntraVENous Q6H PRN    insulin lispro (HUMALOG) injection   SubCUTAneous AC&HS    glucose chewable tablet 16 g  4 Tablet Oral PRN    glucagon (GLUCAGEN) injection 1 mg  1 mg IntraMUSCular PRN    dextrose 10 % infusion 0-250 mL  0-250 mL IntraVENous PRN    hydrALAZINE (APRESOLINE) 20 mg/mL injection 10 mg  10 mg IntraVENous Q6H PRN    0.9% sodium chloride infusion  50 mL/hr IntraVENous CONTINUOUS    Vancomycin - Pharmacy to Dose   Other Rx Dosing/Monitoring    piperacillin-tazobactam (ZOSYN) 3.375 g in 0.9% sodium chloride (MBP/ADV) 100 mL MBP  3.375 g IntraVENous Q8H    vancomycin (VANCOCIN) 500 mg in 0.9% sodium chloride (MBP/ADV) 100 mL MBP  500 mg IntraVENous Q24H     ______________________________________________________________________  EXPECTED LENGTH OF STAY: - - -  ACTUAL LENGTH OF STAY:          0                 Tim Christie MD

## 2022-05-29 NOTE — PROGRESS NOTES
Transition of Care Plan  RUR N/A    Medicare Outpatient Observation Notice (MOON)/ [place] Outpatient Observation Notice (Annemarie Backers) provided to patient/representative with verbal explanation of the notice. Time allotted for questions regarding the notice. Patient /representative provided a completed copy of the MOON/VOON notice. Copy placed on bedside chart.  Explained the Medicare and [facility] observation letters to sons-- Christa Busby and Randall Ortiz --letters signed and placed in chart    Disposition   Pending medical and therapy progress and recommendations-- Family and patient would like to return to [facility]  Contact today- Liaison Peewee Bolus [phone]  Referral sent via care port  Per Daralene Bolus-- Therapy evaluations are needed for acceptance to return to [facility]      Transportation   [facility] phone [phone]    Medical follow up   PCP and specialist    Contact  POA-- Maria Ines Mahoney [phone]  FAVIOLA Roque  [phone] [facility] wife)  Felicitas Yessi  [phone]  POA Son Dana Chandler [phone]    Reason for Admission:  Rectal bleeding-GI bleed  Hx alzheimer's dementia, hip fracture (5/12/22), hypertension, UTI,pneumonia                  RUR Score:     N/A                Plan for utilizing home health:      Not at this time-- plans to return to [facility]    PCP: First and Last name:  Gregory Garcia MD     Name of Practice:    Are you a current patient: Yes/No: yes   Approximate date of last visit:    Can you participate in a virtual visit with your PCP:                     Current Advanced Directive/Advance Care Plan: DNR    Healthcare Decision Maker:   Click here to complete [address] including selection of the Healthcare Decision Maker Relationship (ie \"Primary\")                        Transition of Care Plan:        Return to [facility] to complete rehab, medical follow up   Pending medical and therapy progress and recommendations    CM met with patient, and two of her 5 sons in patient's room to introduce self and explain role. Patient did not participate in the conversation as she was sleeping. Son's confirmed demographics, PCP and insurance. She had hip fracture repair on 5/12/22 at [facility]-- discharged to [facility] on 5/24/22  Receiving IV abx through PICC line at the facility for pneumonia. Prior to hip fracture, patient was living in her own home in [place]. Her son Barby Galvan lives with her --he is the main caretaker-- Patient has 5 sons-- all but one lives in the [place] area. Son Lin Rodriguez and Remington Son assist with patient's care. Patient was going to respite care at a local [facility] during the day. Ashvin's wife Janay Busby, is also very supportive. .  Patient uses a RW for mobility and was able to complete ADL's and IADL's with some assistance prior to hip fracture. Family would like patient to return to [facility] when medically ready for discharge. Referral sent to [facility] via [facility]. CM talked with liaison, Gail Duane [phone] and sent referral via [facility]. CM and sons did discuss alternate plans-- ie SNF if patient is not able to return to [facility]. . therapy will evaluate and make recommendations. Sons did not have any SNF choices at this time. CM will follow and assist with transition of care plan. Care Management Interventions  PCP Verified by CM: Yes  Mode of Transport at Discharge: BLS  Transition of Care Consult (CM Consult):  Other  Physical Therapy Consult: No  Occupational Therapy Consult: No  Support Systems: Child(nisha),Other Family Member(s)  Confirm Follow Up Transport: Family  The Plan for Transition of Care is Related to the Following Treatment Goals : [facility]  The Patient and/or Patient Representative was Provided with a Choice of Provider and Agrees with the Discharge Plan?: Yes  Freedom of Choice List was Provided with Basic Dialogue that Supports the Patient's Individualized Plan of Care/Goals, Treatment Preferences and Shares the Quality Data Associated with the Providers?: Yes  Discharge Location  Patient Expects to be Discharged to[de-identified] Rehab hospital/unit acute (TBD  pending medical and therapy progress and recommenadations)

## 2022-05-29 NOTE — PROGRESS NOTES
Pharmacist Note - Vancomycin Dosing  Therapy day 3  Indication: HAP  Current regimen: 500 mg IV Q24h    Recent Labs     05/28/22  1711 05/28/22  0949 05/28/22  0754   WBC 15.8* 15.9* 15.0*   CREA 1.65*  --  1.50*   BUN 61*  --  56*       A random vancomycin level of 8.1 mcg/mL was obtained and from this level, obtained ~31 hours post-dose. Trough extrapolated to ~15.5 mcg/mL    Goal target range Trough 10-15 mcg/mL      Plan: Continue current regimen. Pharmacy will continue to monitor this patient daily for changes in clinical status and renal function.

## 2022-05-29 NOTE — PROGRESS NOTES
NOTIFICATION RETURN TO WORK / SCHOOL 
 
7/27/2018 4:49 PM 
 
Ms. Cheko Swan [facility] [address] [address] To Whom It May Concern: 
 
Cheko Swan is currently under the care of [facility]. She will return to work/school on: 7/29/18 If there are questions or concerns please have the patient contact our office.  
 
 
 
Sincerely, 
 
 
CORI Dumont 
 
 
                                
 
 Transition of Care Plan  RUR N/A     Observation status-- Cm reviewed chart-- noted patient confused-(alzheimer's dementia) - attempted to meet with patient but she was sleeping soundly -- Left message for emergency contact-- son  Evertt Gosselin  [phone] to call Cm. Other emergency contact-- Surinder Raya had generic VM--did not leave message. Cm noted in chart that patient was sent to ER from [facility] due to rectal bleeding. She had right hip replacement (due to hip fracture) on 5/12/22 at [facility]. Discharged to [facility] . Receiving therapy and  being treated for pneumonia through PICC line at [facility]. CM will try to talk with son today regarding observation status and transition of care plan. Patient has Medicare and [employer].

## 2022-05-30 PROBLEM — N17.9 AKI (ACUTE KIDNEY INJURY) (HCC): Status: ACTIVE | Noted: 2022-05-30

## 2022-05-30 PROBLEM — J18.9 PNEUMONIA: Status: ACTIVE | Noted: 2022-05-30

## 2022-05-30 LAB
ANION GAP SERPL CALC-SCNC: 9 MMOL/L (ref 5–15)
BASOPHILS # BLD: 0 K/UL (ref 0–0.1)
BASOPHILS NFR BLD: 0 % (ref 0–1)
BUN SERPL-MCNC: 45 MG/DL (ref 6–20)
BUN/CREAT SERPL: 37 (ref 12–20)
CALCIUM SERPL-MCNC: 8.3 MG/DL (ref 8.5–10.1)
CHLORIDE SERPL-SCNC: 116 MMOL/L (ref 97–108)
CO2 SERPL-SCNC: 23 MMOL/L (ref 21–32)
CREAT SERPL-MCNC: 1.23 MG/DL (ref 0.55–1.02)
DIFFERENTIAL METHOD BLD: ABNORMAL
EOSINOPHIL # BLD: 0.1 K/UL (ref 0–0.4)
EOSINOPHIL NFR BLD: 1 % (ref 0–7)
ERYTHROCYTE [DISTWIDTH] IN BLOOD BY AUTOMATED COUNT: 15.4 % (ref 11.5–14.5)
GLUCOSE BLD STRIP.AUTO-MCNC: 228 MG/DL (ref 65–117)
GLUCOSE BLD STRIP.AUTO-MCNC: 229 MG/DL (ref 65–117)
GLUCOSE BLD STRIP.AUTO-MCNC: 260 MG/DL (ref 65–117)
GLUCOSE BLD STRIP.AUTO-MCNC: 325 MG/DL (ref 65–117)
GLUCOSE SERPL-MCNC: 224 MG/DL (ref 65–100)
HCT VFR BLD AUTO: 25.5 % (ref 35–47)
HCT VFR BLD AUTO: 26.3 % (ref 35–47)
HCT VFR BLD AUTO: 26.8 % (ref 35–47)
HGB BLD-MCNC: 7.6 G/DL (ref 11.5–16)
HGB BLD-MCNC: 7.8 G/DL (ref 11.5–16)
HGB BLD-MCNC: 7.9 G/DL (ref 11.5–16)
IMM GRANULOCYTES # BLD AUTO: 0.1 K/UL (ref 0–0.04)
IMM GRANULOCYTES NFR BLD AUTO: 1 % (ref 0–0.5)
LYMPHOCYTES # BLD: 0.8 K/UL (ref 0.8–3.5)
LYMPHOCYTES NFR BLD: 9 % (ref 12–49)
MCH RBC QN AUTO: 28 PG (ref 26–34)
MCHC RBC AUTO-ENTMCNC: 30 G/DL (ref 30–36.5)
MCV RBC AUTO: 93.3 FL (ref 80–99)
MONOCYTES # BLD: 0.5 K/UL (ref 0–1)
MONOCYTES NFR BLD: 6 % (ref 5–13)
NEUTS SEG # BLD: 7.4 K/UL (ref 1.8–8)
NEUTS SEG NFR BLD: 83 % (ref 32–75)
NRBC # BLD: 0 K/UL (ref 0–0.01)
NRBC BLD-RTO: 0 PER 100 WBC
PLATELET # BLD AUTO: 485 K/UL (ref 150–400)
PMV BLD AUTO: 10.3 FL (ref 8.9–12.9)
POTASSIUM SERPL-SCNC: 3.2 MMOL/L (ref 3.5–5.1)
RBC # BLD AUTO: 2.82 M/UL (ref 3.8–5.2)
RBC MORPH BLD: ABNORMAL
RBC MORPH BLD: ABNORMAL
SERVICE CMNT-IMP: ABNORMAL
SODIUM SERPL-SCNC: 148 MMOL/L (ref 136–145)
VANCOMYCIN SERPL-MCNC: 16.6 UG/ML
WBC # BLD AUTO: 8.9 K/UL (ref 3.6–11)

## 2022-05-30 PROCEDURE — 80048 BASIC METABOLIC PNL TOTAL CA: CPT

## 2022-05-30 PROCEDURE — 82962 GLUCOSE BLOOD TEST: CPT

## 2022-05-30 PROCEDURE — 74011000250 HC RX REV CODE- 250: Performed by: STUDENT IN AN ORGANIZED HEALTH CARE EDUCATION/TRAINING PROGRAM

## 2022-05-30 PROCEDURE — C9113 INJ PANTOPRAZOLE SODIUM, VIA: HCPCS | Performed by: STUDENT IN AN ORGANIZED HEALTH CARE EDUCATION/TRAINING PROGRAM

## 2022-05-30 PROCEDURE — 85014 HEMATOCRIT: CPT

## 2022-05-30 PROCEDURE — 96376 TX/PRO/DX INJ SAME DRUG ADON: CPT

## 2022-05-30 PROCEDURE — 85025 COMPLETE CBC W/AUTO DIFF WBC: CPT

## 2022-05-30 PROCEDURE — 65270000046 HC RM TELEMETRY

## 2022-05-30 PROCEDURE — G0378 HOSPITAL OBSERVATION PER HR: HCPCS

## 2022-05-30 PROCEDURE — 74011250636 HC RX REV CODE- 250/636: Performed by: STUDENT IN AN ORGANIZED HEALTH CARE EDUCATION/TRAINING PROGRAM

## 2022-05-30 PROCEDURE — 74011636637 HC RX REV CODE- 636/637: Performed by: STUDENT IN AN ORGANIZED HEALTH CARE EDUCATION/TRAINING PROGRAM

## 2022-05-30 PROCEDURE — 94760 N-INVAS EAR/PLS OXIMETRY 1: CPT

## 2022-05-30 PROCEDURE — 74011000258 HC RX REV CODE- 258: Performed by: STUDENT IN AN ORGANIZED HEALTH CARE EDUCATION/TRAINING PROGRAM

## 2022-05-30 PROCEDURE — 85018 HEMOGLOBIN: CPT

## 2022-05-30 PROCEDURE — 80202 ASSAY OF VANCOMYCIN: CPT

## 2022-05-30 PROCEDURE — 36415 COLL VENOUS BLD VENIPUNCTURE: CPT

## 2022-05-30 RX ORDER — INSULIN GLARGINE 100 [IU]/ML
7 INJECTION, SOLUTION SUBCUTANEOUS DAILY
Status: DISCONTINUED | OUTPATIENT
Start: 2022-05-30 | End: 2022-05-31

## 2022-05-30 RX ORDER — INSULIN GLARGINE 100 [IU]/ML
7 INJECTION, SOLUTION SUBCUTANEOUS
Status: DISCONTINUED | OUTPATIENT
Start: 2022-05-30 | End: 2022-05-30

## 2022-05-30 RX ORDER — POTASSIUM CHLORIDE 14.9 MG/ML
10 INJECTION INTRAVENOUS
Status: COMPLETED | OUTPATIENT
Start: 2022-05-30 | End: 2022-05-30

## 2022-05-30 RX ADMIN — VANCOMYCIN HYDROCHLORIDE 750 MG: 750 INJECTION, POWDER, LYOPHILIZED, FOR SOLUTION INTRAVENOUS at 21:42

## 2022-05-30 RX ADMIN — Medication 2 UNITS: at 21:45

## 2022-05-30 RX ADMIN — Medication 10 ML: at 21:50

## 2022-05-30 RX ADMIN — Medication 2 UNITS: at 06:44

## 2022-05-30 RX ADMIN — PIPERACILLIN SODIUM AND TAZOBACTAM SODIUM 3.38 G: 3; 375 INJECTION, POWDER, FOR SOLUTION INTRAVENOUS at 11:15

## 2022-05-30 RX ADMIN — Medication 4 UNITS: at 17:34

## 2022-05-30 RX ADMIN — Medication 10 ML: at 12:46

## 2022-05-30 RX ADMIN — Medication 2 UNITS: at 11:45

## 2022-05-30 RX ADMIN — PIPERACILLIN SODIUM AND TAZOBACTAM SODIUM 3.38 G: 3; 375 INJECTION, POWDER, FOR SOLUTION INTRAVENOUS at 18:42

## 2022-05-30 RX ADMIN — POTASSIUM CHLORIDE 10 MEQ: 14.9 INJECTION, SOLUTION INTRAVENOUS at 12:45

## 2022-05-30 RX ADMIN — PIPERACILLIN SODIUM AND TAZOBACTAM SODIUM 3.38 G: 3; 375 INJECTION, POWDER, FOR SOLUTION INTRAVENOUS at 03:30

## 2022-05-30 RX ADMIN — SODIUM CHLORIDE, PRESERVATIVE FREE 40 MG: 5 INJECTION INTRAVENOUS at 12:44

## 2022-05-30 RX ADMIN — SODIUM CHLORIDE, PRESERVATIVE FREE 40 MG: 5 INJECTION INTRAVENOUS at 21:43

## 2022-05-30 RX ADMIN — Medication 10 ML: at 06:00

## 2022-05-30 RX ADMIN — POTASSIUM CHLORIDE 10 MEQ: 14.9 INJECTION, SOLUTION INTRAVENOUS at 14:10

## 2022-05-30 RX ADMIN — SODIUM CHLORIDE 75 ML/HR: 9 INJECTION, SOLUTION INTRAVENOUS at 11:19

## 2022-05-30 RX ADMIN — INSULIN GLARGINE 7 UNITS: 100 INJECTION, SOLUTION SUBCUTANEOUS at 08:58

## 2022-05-30 NOTE — PROGRESS NOTES
[facility] [facility] Adult  Hospitalist Group                                                                                          Hospitalist Progress Note  Whitney Martinez MD  Answering service: [phone] from in house phone        Date of Service:  2022  NAME:  Christa Piedra  :  1935  MRN:  [id number]      Admission Summary:     Christa Piedra is a 82 y.o. female with past medical history of anxiety, Alzheimer's dementia, hypertension, recent hip replacement right hip on 2022 at [address] [place] [address], diabetes, UTI, being treated for pneumonia through a PICC line with vancomycin and Zosyn at inpatient rehab. [facility]. She was here earlier this morning when they found her to have a substantially sized blood clot in her diaper. She was sent from [facility] due to concern for GI bleed. While here in the ER patient was hemodynamically stable and there were no signs of bleeding so she was monitored and sent back to rehab. Apparently later in the evening the patient presented with another blood clot in her diaper, so the people at [facility] rehab sent her back due to concerns. Her hemoglobin has dropped from about 9.5 to about 9 since the morning. Patient is minimally participative and responsive which is near her baseline from her dementia, but family says that she is more participative usually. They do report that she was kept at [address] [place] [address] for about 2 weeks and required 2 different transfusions while there. They also report that when she was discharged from [address] [place] [address] she was discharged without a blood thinner but that she had been started on a blood thinner for apparent DVT prophylaxis at [facility]. I am unable to get a review of systems from the patient due to her not responding to my questions.     In the ER the patient has been hemodynamically stable, with chest x-ray redemonstrating pneumonia, and no signs of bleeding at this time.   I spoke to her family on the telephone and they deny any known issues with fevers, complaints of chest pains, diarrhea, or any other symptoms aside from the bleeding. She has not eaten anything since the morning. Interval history / Subjective:   Patient seen and examined for follow-up of GI bleed. Seen and examined earlier this morning by me. The patient is looking much improved this morning. Patient opens her eyes and answer some questions. She is minimally more responsive than she was before. Per nursing she did have a bloody stool this morning.      Assessment & Plan:       GIB (gastrointestinal bleeding) (5/28/2022)  H&H every 8 hours  Remote telemetry  N.p.o.  Hemodynamically stable at this time  Monitor vitals and labs  Transfuse if hemoglobin less than 7  Hemoglobin trended down to 8.2 but now up to 8.6  Continue to monitor  Hemoglobin dropped to 7.9 this morning and 7.8 later today  Plan is to continue to monitor and transfuse as needed  No plans for procedure GI consult  Protonix 40 mg IV twice daily    Anemia secondary to blood loss  As above     Apparent JOSE F, unknown if CKD  Gentle hydration  Check labs in the morning  Increasing hydration    Hypernatremia  Likely due to dehydration  Increasing fluids to 100 cc/h     Pneumonia with right sided PICC  Patient was being treated with vancomycin and Zosyn  Continue antibiotic treatment  Monitor     Alzheimer's dementia  Stable     Diabetes  Sliding scale insulin     Hypertension  Hydralazine IV as needed for blood pressure above 160 for now as patient is n.p.o.     Hypokalemia  Giving IV potassium 20 mEq at this time  Recheck in the morning    Code status: DNR  DVT prophylaxis: Held due to [address] discussed with: Patient/Family and Nurse  Anticipated Disposition: [facility]/Rehab  Anticipated Discharge: 24 hours to 48 hours     Hospital Problems  Date Reviewed: 3/6/2019          Codes Class Noted POA    Pneumonia ICD-10-CM: J18.9  ICD-9-CM: 519  5/30/2022 Unknown        JOSE F (acute kidney injury) [facility]) ICD-10-CM: N17.9  ICD-9-CM: 584.9  5/30/2022 Unknown        GIB (gastrointestinal bleeding) ICD-10-CM: K92.2  ICD-9-CM: 578.9  5/28/2022 Unknown                Review of Systems:   A comprehensive review of systems was negative except for that written in the HPI. Vital Signs:    Last 24hrs VS reviewed since prior progress note. Most recent are:  Visit Vitals  BP (!) 171/81   Pulse 90   Temp 97.5 °F (36.4 °C)   Resp 16   Ht 5' 6\" (1.676 m)   Wt 82.4 kg (181 lb 10.5 oz)   SpO2 95%   BMI 29.32 kg/m²         Intake/Output Summary (Last 24 hours) at 5/30/2022 1422  Last data filed at 5/30/2022 1200  Gross per 24 hour   Intake 250 ml   Output 1900 ml   Net -1650 ml        Physical Examination:     I had a face to face encounter with this patient and independently examined them on 5/30/2022 as outlined below:          Constitutional:  No acute distress, wakes up to voice and answers questions. Looks less pale. ENT:  Oral mucosa moist, oropharynx benign. Resp:  CTA bilaterally. No wheezing/rhonchi/rales. No accessory muscle use   CV:  Regular rhythm, normal rate, no murmurs, gallops, rubs    GI:  Soft, non distended, non tender. normoactive bowel sounds, no hepatosplenomegaly     Musculoskeletal:  No edema, warm, 2+ pulses throughout. Right hip surgical area bandaged. Neurologic:  Moves all extremities. AAOx3, CN II-XII reviewed            Data Review:    Review and/or order of clinical lab test  Review and/or order of tests in the radiology section of CPT  Review and/or order of tests in the medicine section of CPT      Labs:     Recent Labs     05/30/22  1317 05/30/22  0503 05/29/22  1507 05/29/22  0349   WBC  --  8.9  --  14.4*   HGB 7.8* 7.9*   < > 8.2*   HCT 26.8* 26.3*   < > 27.0*   PLT  --  485*  --  508*    < > = values in this interval not displayed.      Recent Labs     05/30/22  0503 05/29/22  0349 05/28/22  1711 05/28/22  0754 05/28/22  0754   NA 148* 143 141   < > 139   K 3.2* 3.3* 3.2*   < > 3.3*   * 110* 107   < > 106   CO2 23 24 24   < > 24   BUN 45* 53* 61*   < > 56*   CREA 1.23* 1.44* 1.65*   < > 1.50*   * 194* 211*   < > 132*   CA 8.3* 8.1* 8.6   < > 8.6   MG  --   --   --   --  1.7    < > = values in this interval not displayed. Recent Labs     05/29/22 0349 05/28/22 1711 05/28/22  0754   ALT 14 17 18   AP 83 85 88   TBILI 1.0 1.2* 1.2*   TP 5.4* 5.9* 6.4   ALB 1.7* 1.9* 1.9*   GLOB 3.7 4.0 4.5*     Recent Labs     05/28/22 1711 05/28/22  0754   INR 1.2* 1.2*   PTP 12.4* 12.1*   APTT 26.5  --       No results for input(s): FE, TIBC, PSAT, FERR in the last 72 hours. No results found for: FOL, RBCF   No results for input(s): PH, PCO2, PO2 in the last 72 hours. No results for input(s): CPK, CKNDX, TROIQ in the last 72 hours.     No lab exists for component: CPKMB  No results found for: CHOL, CHOLX, CHLST, CHOLV, HDL, HDLP, LDL, LDLC, DLDLP, TGLX, TRIGL, TRIGP, CHHD, CHHDX  Lab Results   Component Value Date/Time    Glucose (POC) 229 (H) 05/30/2022 11:23 AM    Glucose (POC) 228 (H) 05/30/2022 06:34 AM    Glucose (POC) 203 (H) 05/29/2022 09:53 PM    Glucose (POC) 188 (H) 05/29/2022 04:09 PM    Glucose (POC) 181 (H) 05/29/2022 11:46 AM     No results found for: COLOR, APPRN, SPGRU, REFSG, KEANU, PROTU, GLUCU, KETU, BILU, UROU, DILMA, LEUKU, GLUKE, EPSU, BACTU, WBCU, RBCU, CASTS, UCRY      Medications Reviewed:     Current Facility-Administered Medications   Medication Dose Route Frequency    insulin glargine (LANTUS) injection 7 Units  7 Units SubCUTAneous DAILY    vancomycin (VANCOCIN) 750 mg in 0.9% sodium chloride 250 mL ([id number])  750 mg IntraVENous Q24H    potassium chloride 10 mEq in 50 ml IVPB  10 mEq IntraVENous Q1H    pantoprazole (PROTONIX) 40 mg in 0.9% sodium chloride 10 mL injection  40 mg IntraVENous Q12H    sodium chloride (NS) flush 5-40 mL  5-40 mL IntraVENous Q8H    sodium chloride (NS) flush 5-40 mL  5-40 mL IntraVENous PRN    acetaminophen (TYLENOL) tablet 650 mg  650 mg Oral Q6H PRN    Or    acetaminophen (TYLENOL) suppository 650 mg  650 mg Rectal Q6H PRN    polyethylene glycol (MIRALAX) packet 17 g  17 g Oral DAILY PRN    ondansetron (ZOFRAN ODT) tablet 4 mg  4 mg Oral Q8H PRN    Or    ondansetron (ZOFRAN) injection 4 mg  4 mg IntraVENous Q6H PRN    insulin lispro (HUMALOG) injection   SubCUTAneous AC&HS    glucose chewable tablet 16 g  4 Tablet Oral PRN    glucagon (GLUCAGEN) injection 1 mg  1 mg IntraMUSCular PRN    dextrose 10 % infusion 0-250 mL  0-250 mL IntraVENous PRN    hydrALAZINE (APRESOLINE) 20 mg/mL injection 10 mg  10 mg IntraVENous Q6H PRN    0.9% sodium chloride infusion  100 mL/hr IntraVENous CONTINUOUS    Vancomycin - Pharmacy to Dose   Other Rx Dosing/Monitoring    piperacillin-tazobactam (ZOSYN) 3.375 g in 0.9% sodium chloride (MBP/ADV) 100 mL MBP  3.375 g IntraVENous Q8H     ______________________________________________________________________  EXPECTED LENGTH OF STAY: - - -  ACTUAL LENGTH OF STAY:          0                 Franklin Mckinney MD

## 2022-05-30 NOTE — PROGRESS NOTES
Pharmacist Note - Vancomycin Dosing  Therapy day 6  Indication: PNA  Current regimen: 500 mg q24h    Recent Labs     05/30/22  0503 05/29/22  0349 05/28/22  1711   WBC 8.9 14.4* 15.8*   CREA 1.23* 1.44* 1.65*   BUN 45* 53* 61*       A random vancomycin level of 16.6 mcg/mL was obtained and from this level, the patient's AUC24 is calculated to be 314 with the current regimen. Goal target range AUC/VICENTE 400-600      Plan: Change to 750 mg IV q24h . Pharmacy will continue to monitor this patient daily for changes in clinical status and renal function. *Random vancomycin levels are used to calculate AUC/VICENTE, this level should not be interpreted as a trough. Vancomycin has been dosed using Bayesian kinetics software to target an AUC24:VICENTE of 400-600, which provides adequate exposure for as assumed infection due to MRSA with an VICENTE of 1 or less while reducing the risk of nephrotoxicity as seen with traditional trough based dosing goals.

## 2022-05-30 NOTE — PROGRESS NOTES
Problem: Impaired Skin Integrity/Pressure Injury Treatment  Goal: *Prevention of pressure injury  Description: Document Eusebio Scale and appropriate interventions in the flowsheet.   Outcome: Progressing Towards Goal  Note: Pressure Injury Interventions:  Sensory Interventions: Assess changes in LOC,Check visual cues for pain,Float heels,Keep linens dry and wrinkle-free    Moisture Interventions: Absorbent underpads    Activity Interventions: Pressure redistribution bed/mattress(bed type)    Mobility Interventions: Pressure redistribution bed/mattress (bed type)    Nutrition Interventions: Document food/fluid/supplement intake,Offer support with meals,snacks and hydration    Friction and Shear Interventions: Apply protective barrier, creams and emollients,Lift sheet,Minimize layers

## 2022-05-31 LAB
ANION GAP SERPL CALC-SCNC: 5 MMOL/L (ref 5–15)
BASOPHILS # BLD: 0 K/UL (ref 0–0.1)
BASOPHILS NFR BLD: 0 % (ref 0–1)
BUN SERPL-MCNC: 34 MG/DL (ref 6–20)
BUN/CREAT SERPL: 33 (ref 12–20)
CALCIUM SERPL-MCNC: 8.5 MG/DL (ref 8.5–10.1)
CHLORIDE SERPL-SCNC: 115 MMOL/L (ref 97–108)
CO2 SERPL-SCNC: 24 MMOL/L (ref 21–32)
CREAT SERPL-MCNC: 1.04 MG/DL (ref 0.55–1.02)
DIFFERENTIAL METHOD BLD: ABNORMAL
EOSINOPHIL # BLD: 0.2 K/UL (ref 0–0.4)
EOSINOPHIL NFR BLD: 3 % (ref 0–7)
ERYTHROCYTE [DISTWIDTH] IN BLOOD BY AUTOMATED COUNT: 15.6 % (ref 11.5–14.5)
EST. AVERAGE GLUCOSE BLD GHB EST-MCNC: 160 MG/DL
GLUCOSE BLD STRIP.AUTO-MCNC: 111 MG/DL (ref 65–117)
GLUCOSE BLD STRIP.AUTO-MCNC: 154 MG/DL (ref 65–117)
GLUCOSE BLD STRIP.AUTO-MCNC: 226 MG/DL (ref 65–117)
GLUCOSE BLD STRIP.AUTO-MCNC: 257 MG/DL (ref 65–117)
GLUCOSE SERPL-MCNC: 233 MG/DL (ref 65–100)
HBA1C MFR BLD: 7.2 % (ref 4–5.6)
HCT VFR BLD AUTO: 26.2 % (ref 35–47)
HCT VFR BLD AUTO: 26.3 % (ref 35–47)
HGB BLD-MCNC: 7.7 G/DL (ref 11.5–16)
HGB BLD-MCNC: 7.8 G/DL (ref 11.5–16)
IMM GRANULOCYTES # BLD AUTO: 0.1 K/UL (ref 0–0.04)
IMM GRANULOCYTES NFR BLD AUTO: 1 % (ref 0–0.5)
LYMPHOCYTES # BLD: 1 K/UL (ref 0.8–3.5)
LYMPHOCYTES NFR BLD: 11 % (ref 12–49)
MCH RBC QN AUTO: 27.8 PG (ref 26–34)
MCHC RBC AUTO-ENTMCNC: 29.8 G/DL (ref 30–36.5)
MCV RBC AUTO: 93.2 FL (ref 80–99)
MONOCYTES # BLD: 0.5 K/UL (ref 0–1)
MONOCYTES NFR BLD: 6 % (ref 5–13)
NEUTS SEG # BLD: 7.1 K/UL (ref 1.8–8)
NEUTS SEG NFR BLD: 79 % (ref 32–75)
NRBC # BLD: 0.02 K/UL (ref 0–0.01)
NRBC BLD-RTO: 0.2 PER 100 WBC
PLATELET # BLD AUTO: 460 K/UL (ref 150–400)
PMV BLD AUTO: 10.3 FL (ref 8.9–12.9)
POTASSIUM SERPL-SCNC: 3 MMOL/L (ref 3.5–5.1)
RBC # BLD AUTO: 2.81 M/UL (ref 3.8–5.2)
SERVICE CMNT-IMP: ABNORMAL
SERVICE CMNT-IMP: NORMAL
SODIUM SERPL-SCNC: 144 MMOL/L (ref 136–145)
WBC # BLD AUTO: 8.9 K/UL (ref 3.6–11)

## 2022-05-31 PROCEDURE — 99233 SBSQ HOSP IP/OBS HIGH 50: CPT | Performed by: CLINICAL NURSE SPECIALIST

## 2022-05-31 PROCEDURE — 65270000046 HC RM TELEMETRY

## 2022-05-31 PROCEDURE — 85018 HEMOGLOBIN: CPT

## 2022-05-31 PROCEDURE — 74011000258 HC RX REV CODE- 258: Performed by: STUDENT IN AN ORGANIZED HEALTH CARE EDUCATION/TRAINING PROGRAM

## 2022-05-31 PROCEDURE — 97530 THERAPEUTIC ACTIVITIES: CPT

## 2022-05-31 PROCEDURE — 80048 BASIC METABOLIC PNL TOTAL CA: CPT

## 2022-05-31 PROCEDURE — 82962 GLUCOSE BLOOD TEST: CPT

## 2022-05-31 PROCEDURE — 74011000250 HC RX REV CODE- 250: Performed by: STUDENT IN AN ORGANIZED HEALTH CARE EDUCATION/TRAINING PROGRAM

## 2022-05-31 PROCEDURE — C9113 INJ PANTOPRAZOLE SODIUM, VIA: HCPCS | Performed by: STUDENT IN AN ORGANIZED HEALTH CARE EDUCATION/TRAINING PROGRAM

## 2022-05-31 PROCEDURE — 85025 COMPLETE CBC W/AUTO DIFF WBC: CPT

## 2022-05-31 PROCEDURE — 83036 HEMOGLOBIN GLYCOSYLATED A1C: CPT

## 2022-05-31 PROCEDURE — 74011250636 HC RX REV CODE- 250/636: Performed by: STUDENT IN AN ORGANIZED HEALTH CARE EDUCATION/TRAINING PROGRAM

## 2022-05-31 PROCEDURE — 74011250637 HC RX REV CODE- 250/637: Performed by: STUDENT IN AN ORGANIZED HEALTH CARE EDUCATION/TRAINING PROGRAM

## 2022-05-31 PROCEDURE — 99221 1ST HOSP IP/OBS SF/LOW 40: CPT

## 2022-05-31 PROCEDURE — 36415 COLL VENOUS BLD VENIPUNCTURE: CPT

## 2022-05-31 PROCEDURE — 97161 PT EVAL LOW COMPLEX 20 MIN: CPT

## 2022-05-31 PROCEDURE — 74011636637 HC RX REV CODE- 636/637: Performed by: STUDENT IN AN ORGANIZED HEALTH CARE EDUCATION/TRAINING PROGRAM

## 2022-05-31 RX ORDER — FUROSEMIDE 10 MG/ML
20 INJECTION INTRAMUSCULAR; INTRAVENOUS ONCE
Status: COMPLETED | OUTPATIENT
Start: 2022-05-31 | End: 2022-05-31

## 2022-05-31 RX ORDER — FUROSEMIDE 10 MG/ML
40 INJECTION INTRAMUSCULAR; INTRAVENOUS ONCE
Status: DISCONTINUED | OUTPATIENT
Start: 2022-05-31 | End: 2022-05-31

## 2022-05-31 RX ORDER — INSULIN GLARGINE 100 [IU]/ML
10 INJECTION, SOLUTION SUBCUTANEOUS DAILY
Status: DISCONTINUED | OUTPATIENT
Start: 2022-05-31 | End: 2022-06-03 | Stop reason: HOSPADM

## 2022-05-31 RX ORDER — POTASSIUM CHLORIDE 14.9 MG/ML
10 INJECTION INTRAVENOUS
Status: COMPLETED | OUTPATIENT
Start: 2022-05-31 | End: 2022-05-31

## 2022-05-31 RX ORDER — INSULIN LISPRO 100 [IU]/ML
INJECTION, SOLUTION INTRAVENOUS; SUBCUTANEOUS
Status: DISCONTINUED | OUTPATIENT
Start: 2022-05-31 | End: 2022-06-03 | Stop reason: HOSPADM

## 2022-05-31 RX ORDER — ATORVASTATIN CALCIUM 10 MG/1
10 TABLET, FILM COATED ORAL
Status: DISCONTINUED | OUTPATIENT
Start: 2022-05-31 | End: 2022-06-03 | Stop reason: HOSPADM

## 2022-05-31 RX ORDER — AMLODIPINE BESYLATE 5 MG/1
10 TABLET ORAL DAILY
Status: DISCONTINUED | OUTPATIENT
Start: 2022-05-31 | End: 2022-06-03 | Stop reason: HOSPADM

## 2022-05-31 RX ORDER — CARVEDILOL 6.25 MG/1
12.5 TABLET ORAL 2 TIMES DAILY WITH MEALS
Status: DISCONTINUED | OUTPATIENT
Start: 2022-05-31 | End: 2022-06-01

## 2022-05-31 RX ADMIN — Medication 10 ML: at 22:02

## 2022-05-31 RX ADMIN — POTASSIUM CHLORIDE 10 MEQ: 14.9 INJECTION, SOLUTION INTRAVENOUS at 09:35

## 2022-05-31 RX ADMIN — FUROSEMIDE 20 MG: 10 INJECTION, SOLUTION INTRAMUSCULAR; INTRAVENOUS at 11:02

## 2022-05-31 RX ADMIN — INSULIN GLARGINE 10 UNITS: 100 INJECTION, SOLUTION SUBCUTANEOUS at 09:26

## 2022-05-31 RX ADMIN — Medication 5 UNITS: at 11:42

## 2022-05-31 RX ADMIN — Medication 10 ML: at 13:07

## 2022-05-31 RX ADMIN — POTASSIUM CHLORIDE 10 MEQ: 14.9 INJECTION, SOLUTION INTRAVENOUS at 13:05

## 2022-05-31 RX ADMIN — SODIUM CHLORIDE, PRESERVATIVE FREE 40 MG: 5 INJECTION INTRAVENOUS at 09:25

## 2022-05-31 RX ADMIN — CARVEDILOL 12.5 MG: 6.25 TABLET, FILM COATED ORAL at 16:24

## 2022-05-31 RX ADMIN — Medication 10 ML: at 06:18

## 2022-05-31 RX ADMIN — VANCOMYCIN HYDROCHLORIDE 750 MG: 750 INJECTION, POWDER, LYOPHILIZED, FOR SOLUTION INTRAVENOUS at 19:00

## 2022-05-31 RX ADMIN — POTASSIUM CHLORIDE 10 MEQ: 14.9 INJECTION, SOLUTION INTRAVENOUS at 11:15

## 2022-05-31 RX ADMIN — CARVEDILOL 12.5 MG: 6.25 TABLET, FILM COATED ORAL at 10:59

## 2022-05-31 RX ADMIN — SODIUM CHLORIDE, PRESERVATIVE FREE 40 MG: 5 INJECTION INTRAVENOUS at 22:01

## 2022-05-31 RX ADMIN — PIPERACILLIN SODIUM AND TAZOBACTAM SODIUM 3.38 G: 3; 375 INJECTION, POWDER, FOR SOLUTION INTRAVENOUS at 18:53

## 2022-05-31 RX ADMIN — PIPERACILLIN SODIUM AND TAZOBACTAM SODIUM 3.38 G: 3; 375 INJECTION, POWDER, FOR SOLUTION INTRAVENOUS at 11:01

## 2022-05-31 RX ADMIN — ATORVASTATIN CALCIUM 10 MG: 10 TABLET, FILM COATED ORAL at 22:01

## 2022-05-31 RX ADMIN — PIPERACILLIN SODIUM AND TAZOBACTAM SODIUM 3.38 G: 3; 375 INJECTION, POWDER, FOR SOLUTION INTRAVENOUS at 03:27

## 2022-05-31 RX ADMIN — FUROSEMIDE 20 MG: 10 INJECTION, SOLUTION INTRAMUSCULAR; INTRAVENOUS at 16:24

## 2022-05-31 RX ADMIN — Medication 2 UNITS: at 17:02

## 2022-05-31 RX ADMIN — Medication 2 UNITS: at 06:44

## 2022-05-31 RX ADMIN — SODIUM CHLORIDE 100 ML/HR: 9 INJECTION, SOLUTION INTRAVENOUS at 09:35

## 2022-05-31 NOTE — DIABETES MGMT
[facility]    CLINICAL NURSE SPECIALIST CONSULT     Initial Presentation   Betty Walters is a 82 y.o. female admitted from rehab with concern for GI bleed after staff noted large blood clot in patient's brief-was initially sent back to rehab after fully evaluated and remained hemodynamically stable. Was subsequently sent back to ED again for same concern after another large blood clot noted in brief.  hgb dropped 9.5 to about 9 since the morning. Patient is minimally participative and responsive which is near her baseline from her dementia-  S/p recent hip surgery on 5/12 and had received 2 blood transfusions while at OSH.     HX:   Past Medical History:   Diagnosis Date    Anxiety     CAD (coronary artery disease)     Diabetes mellitus ([facility]     Dysuria     H/O joint replacement     HTN (hypertension)     Hypercholesteremia     Osteoarthritis     Recurrent UTI     Structural abnormality of bladder     Urinary tract infection without hematuria     site unspecified    UTI (urinary tract infection)         INITIAL DX:   GIB (gastrointestinal bleeding) [K92.2]  JOSE F (acute kidney injury) ([facility] [N17.9]  Pneumonia [J18.9]     Current Treatment     TX: NPO- now on clears/ gentle hydration    Consulted by Provider for advanced diabetes nursing assessment and care for:     [x] Inpatient management strategy  n    Hospital Course   Clinical progress has been complicated by concern for GIB/ JOSE F/ pneumonia -    Diabetes History   DM2-A1C 7.2%- but may not be accurate due to recent blood transfusion during admission for recent hip surgery on 5/12/22    Diabetes-related Medical History  Acute complications  Persistent hyperglycemia  Neurological complications  TBD  Microvascular disease  NONE  Macrovascular disease  CAD  Other associated conditions     HTN    Diabetes Medication History  Key Antihyperglycemic Medications             metFORMIN (GLUCOPHAGE) 500 mg tablet     Insulin Lisp & Lisp Prot, Hum, (HUMALOG MIX 75-25 KWIKPEN) 100 unit/mL (75-25) inpn 10 Units. Key CAD CHF Meds             amLODIPine (NORVASC) 10 mg tablet TAKE ONE TABLET BY MOUTH ONCE DAILY    aspirin delayed-release 81 mg tablet 81 mg.    atorvastatin (LIPITOR) 10 mg tablet TAKE ONE TABLET BY MOUTH EVERY NIGHT AT BEDTIME    carvedilol (COREG) 25 mg tablet TAKE ONE-HALF TABLET BY MOUTH TWICE DAILY    hydrALAZINE (APRESOLINE) 50 mg tablet 50 mg. Diabetes self-management practices:   Eating pattern- unable to obtain from patient     Physical activity pattern-debility/ cognitive dysfunction     Monitoring pattern-per rehab facility   [x] Testing BGs sufficiently to inform self-management adjustments    Taking medications pattern  [x] Consistent administration  [x] Affordable  Overall evaluation:        Subjective   Patient lying in bed receiving care from PCT>  She is alert and answers simple questions-      Objective   Physical exam  General Normal weight female in no acute distress/ill-appearing. Conversant and cooperative  Neuro  Alert,   Vital Signs   Visit Vitals  BP (!) 172/89   Pulse 94   Temp 97.5 °F (36.4 °C)   Resp 16   Ht 5' 6\" (1.676 m)   Wt 82.4 kg (181 lb 10.5 oz)   SpO2 92%   BMI 29.32 kg/m²     Skin  Warm and dry. Heart   Regular rate and rhythm. No murmurs, rubs or gallops  Lungs  Clear to auscultation without rales or rhonchi  Extremities No foot wounds; right LE wound noted (PTA?) dressed with dry kerlex. +2 ankle/foot edema noted (PVD?)- another dressing noted on upper right thigh- wound care following        Laboratory  Recent Labs     05/31/22  0332 05/30/22  0503 05/29/22  0349 05/28/22  1711 05/28/22  1711   * 224* 194*   < > 211*   AGAP 5 9 9   < > 10   WBC 8.9 8.9 14.4*   < > 15.8*   CREA 1.04* 1.23* 1.44*   < > 1.65*   GFRNA 50* 42* 35*   < > 30*   AST  --   --  15  --  16   ALT  --   --  14  --  17    < > = values in this interval not displayed.        Factors impacting BG management  Factor Dose Comments   Nutrition:  Standard meals- Clears     60 grams/meal      Drugs:    Blood transfusion(s)       Recent s/p surgery on 5/12 @ OSH       A1C will be inaccurate for next 3mo     Infection pna    Other:   Kidney function  Liver function     GFR 50  WNL      Blood glucose pattern      Significant diabetes-related events over the past 24-72 hours  DM2-A1C 7.2%- for age and functional status A1C in goal   BG trends >200 since admission, noted started on low dose basal and correctional insulin regimen-required 10 units correctional in 5/30/22. Assessment and Plan   Nursing Diagnosis Risk for unstable blood glucose pattern   Nursing Intervention Domain 5250 Decision-making Support   Nursing Interventions Examined current inpatient diabetes/blood glucose control   Explored factors facilitating and impeding inpatient management  Explored corrective strategies with patient and responsible inpatient provider   Informed patient of rational for insulin strategy while hospitalized     Nursing Diagnosis 82567 Ineffective Health Management   Nursing Intervention Domain 5250 Decision-makingSupport   Nursing Interventions Identified diabetes self-management practices impeding diabetes control  Discussed diabetes survival skills related to  1. Healthy Plate eating plan; given handouts  2. Role of physical activity in improving insulin sensitivity and action  3. Procedure for blood glucose monitoring & options for low-cost products available from [facility]   4. Medications plan at discharge     Evaluation   Radha Tellez is an 81yo  female admittedly with concern for GIB which is resolved per notes. She does live with T2D and PTA is on mixed insulin and daily Metformin. Has required basal and correctional insulin while hospitalized for persistent hyperglycemia. Noted basal dose adjusted for today from 7units to 10 units daily.   Will see how this adjustment affects her fasting BG tomorrow before make further recs. Keep -180 while inpatient. Recommendations     [x] Use of Subcutaneous Insulin Order set (2813)  Insulin Dosing Specific recommendation   CONTINUE Basal                                      (Based on weight, BMI & GFR) 10 units daily-Lantus Advance dose up by 20 % on 6/1/22 if BG >200. MODIFIED Corrective                                       (Useful in adjusting insulin dosing) [x] Normal  Sensitivity-GFR 50          Billing Code(s)   85079    Before making these care recommendations, I personally reviewed the hospitalization record, including notes, laboratory & diagnostic data and current medications, and examined the patient at the bedside (circumstances permitting) before making care recommendations. More than fifty (50) percent of the time was spent in patient counseling and/or care coordination.   Total minutes: [address] [facility] [address] CYNDY Schulte  Diabetes Clinical Nurse Specialist  [facility]  Access via [address] [place] [address]

## 2022-05-31 NOTE — WOUND CARE
WOCN Note:     New consult for leg wound. Chart shows:  Admitted on 5/28/22 from [facility]. Admitted for GIB with clot in diaper, PNA, DM, right hip surgery 5/12/22. History of dementia    Assessment:   Non-communicative with mobility team at bedside. Assisted in cleaning her of small mushy stool. Martin. Surface: William gel mattress; sacral foam dressing in use    Bilateral heels intact and without erythema. Heels offloaded with pillows. Sacrum and buttocks intact with no erythema. 1. POA right lower anterior leg hematoma  9 x 10 x 0 cm  No edema or erythema to leg. Bruising noted proximally with closed incision to right hip. Small amount of samm/burgundy exudate  Elevated dome with fluctuance  Left open to air. Wound Recommendations:    RLL: general surgery consult for possible I&D; topical wound care will not provide any change at this time    PI Prevention:  Turn/reposition approximately every 2 hours  Offload heels with heels hanging off end of pillow at all times while in bed. Sacral Foam dressing: lift to assess regularly; change as needed. Discontinue if incontinence is frequently soiling dressing. Z-guard to buttocks and sacrum daily and as needed with incontinence care  Blower added to william bed to create PRATIBHA surface. Discussed with Dr. Aminata Irving and RN, [facility]. Consult for general surgery placed.      Transition of Care: Plan to follow as needed while admitted to hospital.    GADIEL BoyceN, RN, [facility]  Certified Wound, Ostomy, Continence Nurse  office [phone]  Available via [facility]

## 2022-05-31 NOTE — PROGRESS NOTES
Bedside and Verbal shift change report given to Eliot Zamora RN (oncoming nurse) by Zuleika Bailey RN (offgoing nurse).  Report included the following information SBAR, Kardex, Intake/Output and MAR

## 2022-05-31 NOTE — PROGRESS NOTES
Problem: Mobility Impaired (Adult and Pediatric)  Goal: *Acute Goals and Plan of Care (Insert Text)  Description: FUNCTIONAL STATUS PRIOR TO ADMISSION: Patient ambulated with a rolling walker for functional mobility, has a h/o falls w/ injury. HOME SUPPORT PRIOR TO ADMISSION:   The patient is from [facility]. Prior to rehab, the patient lived with her son, able to dress and feed herself, received paid caregiver assist with sink bathing. She attended respite care during the day. Physical Therapy Goals  Initiated 5/31/2022  1. Patient will move from supine to sit and sit to supine in bed with moderate assistance within 7 day(s). 2.  Patient will transfer from bed to chair and chair to bed with moderate assistance using the least restrictive device within 7 day(s). 3.  Patient will perform sit to stand with moderate assistance  within 7 day(s). 4.  Patient will perform exercises with minimal assistance/contact guard assist within 7 days. Outcome: Not Met     PHYSICAL THERAPY EVALUATION  Patient: Ruy Ravi (55 y.o. female)  Date: 5/31/2022  Primary Diagnosis: GIB (gastrointestinal bleeding) [K92.2]  JOSE F (acute kidney injury) ([facility] [address] [N17.9]  Pneumonia [J18.9]        Precautions:  Fall,Other (comment) (Per family RUE NWB w/ brace & RLE WBAT)    ASSESSMENT  Based on the objective data described below, the patient presents with impaired functional mobility. Currently her mobility is limited by right proximal femur fracture w/ cephalo medullary nail 5/12/2022 (per family WBAT), right wrist fracture (per family NWB w/ brace), generalized weakness, decreased ROM, decreased command following (?? [place] as primary contributing factor vs ability), impaired balance (inferred), decreased activity tolerance (fatigues with few ex reps), and dementia. At baseline, patient ambulated on her own with a rolling walker, has h/o falls. She discharged to [facility] rehab after her recent surgery.  Her son notes a mechanical lift was used to transfer her to a wheelchair but that the rehab staff were pleased with her progress during therapy (son did not observe therapy sessions). RN cleared pt for therapy activity. Received pt sleeping in bed with her son Macie Macario and daughter in law reporting pt fatigue d/t bed mobility w/ clothing/ linen change just prior to PT arrival. Today's assessment was limited to bed level activity. Pt demonstrates the ability to follow commands and complete LE ROM with cues & manual assist.  Patient voiced no pain complaints w/ movement. Current Level of Function Impacting Discharge (mobility/balance): Inferred max assist based on discussions    Functional Outcome Measure: The patient scored 5/100 on the Barthel outcome measure which is indicative of 100% impairment in functional tasks and mobility. Other factors to consider for discharge: from [facility]     Patient will benefit from skilled therapy intervention to address the above noted impairments. PLAN :  Recommendations and Planned Interventions: bed mobility training, transfer training, gait training, therapeutic exercises, neuromuscular re-education, patient and family training/education, and therapeutic activities      Frequency/Duration: Patient will be followed by physical therapy:  5 times a week to address goals.     Recommendation for discharge: (in order for the patient to meet his/her long term goals)  Therapy 3 hours per day 5-7 days per week    This discharge recommendation:  Has not yet been discussed the attending provider and/or case management    IF patient discharges home will need the following DME: to be determined (TBD) by [facility]         SUBJECTIVE:       OBJECTIVE DATA SUMMARY:   HISTORY:    Past Medical History:   Diagnosis Date    Anxiety     CAD (coronary artery disease)     Diabetes mellitus ([facility]     Dysuria     H/O joint replacement     HTN (hypertension)     Hypercholesteremia Osteoarthritis     Recurrent UTI     Structural abnormality of bladder     Urinary tract infection without hematuria     site unspecified    UTI (urinary tract infection)      Past Surgical History:   Procedure Laterality Date    HX APPENDECTOMY      HX BACK SURGERY      HX CAROTID ENDARTERECTOMY      HX CATARACT REMOVAL      HX FEMUR FRACTURE TX      HX KNEE REPLACEMENT         Personal factors and/or comorbidities impacting plan of care:     Home Situation  Home Environment: Private residence  # Steps to Enter: 1  One/Two Story Residence: Other (Comment) (tri level)  # of Interior Steps: 5 (5-6 each level)  [employer]: Both  Living Alone: No (l/w son; respite care during the day)  Support Systems: Child(nisha)  Patient Expects to be Discharged to[de-identified] Rehab hospital/unit acute  Current DME Used/Available at Home: Walker, rolling  Tub or Shower Type: Other (comment) (sink bathing with caregiver assistance)    EXAMINATION/PRESENTATION/DECISION MAKING:   Critical Behavior:  Neurologic State: Alert  Orientation Level: Oriented to person,Disoriented to place,Disoriented to situation,Disoriented to time  Cognition: Impaired decision making,Decreased command following (likely related to hearing - does better w/ son's voice)     Hearing:  [place] - does not wear hearing aids  Skin:  refer to medical record for wounds.   Has h/o chronic wounds (per son received home services)  Edema:   Range Of Motion:  AROM: Grossly decreased, non-functional (LE's; Decreased functional UE's except right wrist immobiliz)           PROM: Generally decreased, functional           Strength:    Strength: Grossly decreased, non-functional (unable to lift LE's off bed or move unassisted)                    Tone & Sensation:   Tone: Normal              Sensation: Intact (light touch keira legs, feet)               Coordination:  Coordination: Generally decreased, functional        Functional Mobility:  Bed Mobility:  Inferred Max A     Mobility assessment deferred d/t pt fatigue with bed mobility with clothing and linen changes just prior to this PT session    Therapeutic Education/ Exercises:   Instructed pt and family in ankle pumps, quad set. Had pt complete with manual assist required to improve ROM. Educated in LE positioning for pressure relief. Educated in benefit of LE AROM exs to prevent blood clot  Observed pt actively completing ankle ROM (DF/PF/ circles) while discussing POC w/ family. Functional Measure:  Barthel Index:    Bathin  Bladder: 0  Bowels: 0  Groomin  Dressin  Feedin  Mobility: 0  Stairs: 0  Toilet Use: 0  Transfer (Bed to Chair and Back): 0  Total: 5/100       The Barthel ADL Index: Guidelines  1. The index should be used as a record of what a patient does, not as a record of what a patient could do. 2. The main aim is to establish degree of independence from any help, physical or verbal, however minor and for whatever reason. 3. The need for supervision renders the patient not independent. 4. A patient's performance should be established using the best available evidence. Asking the patient, friends/relatives and nurses are the usual sources, but direct observation and common sense are also important. However direct testing is not needed. 5. Usually the patient's performance over the preceding 24-48 hours is important, but occasionally longer periods will be relevant. 6. Middle categories imply that the patient supplies over 50 per cent of the effort. 7. Use of aids to be independent is allowed. Score Interpretation (from [facility])    Independent   60-79 Minimally independent   40-59 Partially dependent   20-39 Very dependent   <20 Totally dependent     -Alla Ardon., Barthel, D.W. (1965). Functional evaluation: the Barthel Index. [address] ([address] (1997).  The Barthel activities of daily living index: self-reporting versus actual performance in the old (> or = 75 years). Journal of [address] [place] [address] 457), [facility], .JFF, Roby Lott., Carmen Hodge. (1999). Measuring the change in disability after inpatient rehabilitation; comparison of the responsiveness of the Barthel Index and Functional Meade Measure. Journal of Neurology, Neurosurgery, and Psychiatry, 66(4), 579-933. MILENA Diaz, JEFF Go, & Leeanne Harvey M.A. (2004) Assessment of post-stroke quality of life in cost-effectiveness studies: The usefulness of the Barthel Index and the EuroQoL-5D. Quality of Life Research, 15, 156-87            Physical Therapy Evaluation Charge Determination   History Examination Presentation Decision-Making   MEDIUM  Complexity : 1-2 comorbidities / personal factors will impact the outcome/ POC  LOW Complexity : 1-2 Standardized tests and measures addressing body structure, function, activity limitation and / or participation in recreation  LOW Complexity : Stable, uncomplicated  Other outcome measures Barthel 5/100  HIGH       Based on the above components, the patient evaluation is determined to be of the following complexity level: LOW     Pain Rating:  None indicated (verbally stated \"no\" when asked about pain)    Activity Tolerance:   Fair and requires rest breaks    After treatment patient left in no apparent distress:   Call bell within reach, Caregiver / family present, Side rails x 3, and Semi-fowlers in bed    COMMUNICATION/EDUCATION:   The patients plan of care was discussed with: Registered nurse. Patient/family have participated as able in goal setting and plan of care. and Patient/family agree to work toward stated goals and plan of care.     Thank you for this referral.  Priscilla Golden, PT   Time Calculation: 30 mins

## 2022-05-31 NOTE — CONSULTS
[facility] at [facility]  Inpatient Consultation        Admit Date: 5/28/2022  Reason for Consultation: RLE hematoma     HPI:  Yumi Gallagher is a 82 y.o. female anxiety, Alzheimer's dementia, hypertension, recent hip replacement right hip on 5/12/2022 at [facility], diabetes, UTI, being treated for pneumonia via PICC line with vancomycin and Zosyn at inpatient rehab. We are asked to see in [facility] the above complaint. Patient comes from [facility] rehab and was transferred to [facility] ED for GI bleed after a \"blood clot was found in her brief\". Patients son reports that when she was discharged from [facility] she was discharged without a blood thinner, but that she had been started on a blood thinner for apparent DVT prophylaxis at [facility] rehab. RLE hematoma had improved slightly per her family report after discharge from [facility], however, more recently has gotten worse. Pt denies any pain to site. No fevers or chills.    WBC 8.9  Hgb 7.8 Hct 26.2    Patient Active Problem List    Diagnosis Date Noted    Pneumonia 05/30/2022    JOSE F (acute kidney injury) ([address] 05/30/2022    GIB (gastrointestinal bleeding) 05/28/2022    GERD (gastroesophageal reflux disease) 03/06/2019    Gait abnormality 02/25/2019    Chronic diastolic (congestive) heart failure ([address] 02/04/2019    Recurrent UTI 08/30/2017    Late onset Alzheimer's disease without behavioral disturbance ([address] 05/22/2017    Structural abnormality of bladder 03/08/2017    Vaginal discomfort 03/08/2017    Urinary tract infection without hematuria 01/19/2017    Dysuria 11/30/2016    Confusion 11/30/2016    Carotid artery disease ([address] 11/21/2016    Osteoarthritis 11/21/2016    Spinal stenosis 11/21/2016    Unspecified fracture of unspecified wrist and hand, sequela 10/18/2016    Dementia ([address] 10/18/2016    Dementia without behavioral disturbance ([address] 10/18/2016    Long term current use of opiate analgesic 05/19/2016    Chronic bilateral low back pain 04/23/2015    Spondylosis of lumbosacral region without myelopathy or radiculopathy 10/01/2014    Spinal stenosis of lumbar region 08/13/2014    Edema 03/07/2014    Stenosis of right carotid artery 04/09/2013    Benign essential hypertension 02/11/2013    Hypercholesterolemia 11/28/2012    Type 2 diabetes mellitus ([facility] 11/28/2012    Memory loss 11/27/2012     Past Medical History:   Diagnosis Date    Anxiety     CAD (coronary artery disease)     Diabetes mellitus ([facility]     Dysuria     H/O joint replacement     HTN (hypertension)     Hypercholesteremia     Osteoarthritis     Recurrent UTI     Structural abnormality of bladder     Urinary tract infection without hematuria     site unspecified    UTI (urinary tract infection)       Past Surgical History:   Procedure Laterality Date    HX APPENDECTOMY      HX BACK SURGERY      HX CAROTID ENDARTERECTOMY      HX CATARACT REMOVAL      HX FEMUR FRACTURE TX      HX KNEE REPLACEMENT        Social History     Tobacco Use    Smoking status: Never Smoker    Smokeless tobacco: Never Used   Substance Use Topics    Alcohol use: No      No family history on file. Prior to Admission medications    Medication Sig Start Date End Date Taking?  Authorizing Provider   metFORMIN (GLUCOPHAGE) 500 mg tablet  7/4/18   Provider, Historical   rivastigmine tartrate (EXELON) 6 mg capsule 6 mg. 1/20/17   Provider, Historical   acetaminophen (TYLENOL) 325 mg tablet 650 mg. 10/18/16   Provider, Historical   amLODIPine (NORVASC) 10 mg tablet TAKE ONE TABLET BY MOUTH ONCE DAILY 2/23/16   Provider, Historical   aspirin delayed-release 81 mg tablet 81 mg.    Provider, Historical   atorvastatin (LIPITOR) 10 mg tablet TAKE ONE TABLET BY MOUTH EVERY NIGHT AT BEDTIME 12/28/15   Provider, Historical   carvedilol (COREG) 25 mg tablet TAKE ONE-HALF TABLET BY MOUTH TWICE DAILY 4/11/16   Provider, Historical doxepin (SINEQUAN) 25 mg capsule 25 mg. 4/11/16   Provider, Historical   hydrALAZINE (APRESOLINE) 50 mg tablet 50 mg. 10/18/16   Provider, Historical   Insulin Lisp & Lisp Prot, Hum, (HUMALOG MIX 75-25 KWIKPEN) 100 unit/mL (75-25) inpn 10 Units. 3/23/16   Provider, Historical   omeprazole (PRILOSEC) 20 mg capsule 20 mg. 10/18/16   Provider, Historical   oxyCODONE-acetaminophen (PERCOCET) 5-325 mg per tablet Take 2 Tabs by Mouth Every 4 Hours As Needed for Pain.  10/18/16   Provider, Historical     Current Facility-Administered Medications   Medication Dose Route Frequency    insulin glargine (LANTUS) injection 10 Units  10 Units SubCUTAneous DAILY    potassium chloride 10 mEq in 50 ml IVPB  10 mEq IntraVENous Q1H    [Held by provider] amLODIPine (NORVASC) tablet 10 mg  10 mg Oral DAILY    atorvastatin (LIPITOR) tablet 10 mg  10 mg Oral QHS    carvediloL (COREG) tablet 12.5 mg  12.5 mg Oral BID WITH MEALS    insulin lispro (HUMALOG) injection   SubCUTAneous AC&HS    dextrose 10 % infusion 0-250 mL  0-250 mL IntraVENous PRN    vancomycin (VANCOCIN) 750 mg in 0.9% sodium chloride 250 mL ([id number])  750 mg IntraVENous Q24H    pantoprazole (PROTONIX) 40 mg in 0.9% sodium chloride 10 mL injection  40 mg IntraVENous Q12H    sodium chloride (NS) flush 5-40 mL  5-40 mL IntraVENous Q8H    sodium chloride (NS) flush 5-40 mL  5-40 mL IntraVENous PRN    acetaminophen (TYLENOL) tablet 650 mg  650 mg Oral Q6H PRN    Or    acetaminophen (TYLENOL) suppository 650 mg  650 mg Rectal Q6H PRN    polyethylene glycol (MIRALAX) packet 17 g  17 g Oral DAILY PRN    ondansetron (ZOFRAN ODT) tablet 4 mg  4 mg Oral Q8H PRN    Or    ondansetron (ZOFRAN) injection 4 mg  4 mg IntraVENous Q6H PRN    glucose chewable tablet 16 g  4 Tablet Oral PRN    glucagon (GLUCAGEN) injection 1 mg  1 mg IntraMUSCular PRN    dextrose 10 % infusion 0-250 mL  0-250 mL IntraVENous PRN    hydrALAZINE (APRESOLINE) 20 mg/mL injection 10 mg  10 mg IntraVENous Q6H PRN    0.9% sodium chloride infusion  100 mL/hr IntraVENous CONTINUOUS    Vancomycin - Pharmacy to Dose   Other Rx Dosing/Monitoring    piperacillin-tazobactam (ZOSYN) 3.375 g in 0.9% sodium chloride (MBP/ADV) 100 mL MBP  3.375 g IntraVENous Q8H     Allergies   Allergen Reactions    Latex Unknown (comments) and Itching     Other reaction(s): itching    Iodinated Contrast Media Unknown (comments) and Hives          Subjective:     Review of Systems:    A comprehensive review of systems was negative except for that written in the History of Present Illness. Objective:     Blood pressure (!) 172/89, pulse 89, temperature 97.5 °F (36.4 °C), resp. rate 16, height 5' 6\" (1.676 m), weight 181 lb 10.5 oz (82.4 kg), SpO2 92 %. Temp (24hrs), Av.9 °F (36.6 °C), Min:97.5 °F (36.4 °C), Max:98.8 °F (37.1 °C)      Recent Labs     22  0332 22  2102 22  1317 22  0503 22  0503 22  1507 22  0349   WBC 8.9  --   --   --  8.9  --  14.4*   HGB 7.8* 7.6* 7.8*   < > 7.9*   < > 8.2*   HCT 26.2* 25.5* 26.8*   < > 26.3*   < > 27.0*   *  --   --   --  485*  --  508*    < > = values in this interval not displayed. Recent Labs     22  0332 22  0503 22  0349 22  1711 22  1711    148* 143   < > 141   K 3.0* 3.2* 3.3*   < > 3.2*   * 116* 110*   < > 107   CO2 24 23 24   < > 24   * 224* 194*   < > 211*   BUN 34* 45* 53*   < > 61*   CREA 1.04* 1.23* 1.44*   < > 1.65*   CA 8.5 8.3* 8.1*   < > 8.6   ALB  --   --  1.7*  --  1.9*   TBILI  --   --  1.0  --  1.2*   ALT  --   --  14  --  17   INR  --   --   --   --  1.2*    < > = values in this interval not displayed. No results for input(s): AML, LPSE in the last 72 hours.       Intake/Output Summary (Last 24 hours) at 2022 1346  Last data filed at 2022 0807  Gross per 24 hour   Intake 800 ml   Output 1600 ml   Net -800 ml       _____________________  Physical Exam:     General:  Alert, cooperative, no distress, appears stated age. Eyes:   Sclera clear. Throat: Lips, mucosa, and tongue normal.   Neck: Supple, symmetrical, trachea midline. Lungs:   Unlabored    Heart:  Regular rate and rhythm. Abdomen:   Normal BS, flat, Soft, non-tender. No masses,  No organomegaly. Extremities: Extremities normal, atraumatic, no cyanosis, +2-3 pitting edema to BLE     Skin:              POA right lower anterior leg hematoma  9 x 10 x 0 cm, no drainage      Elevated dome with fluctuance  Left open to air  Bruising noted proximally with closed incision to right hip. Assessment:   Active Problems:    GIB (gastrointestinal bleeding) (5/28/2022)      Pneumonia (5/30/2022)      JOSE F (acute kidney injury) ([facility] (5/30/2022)        Plan:     Patient would be a difficult surgical candidate due to current health condition / history of CAD, anemia, PNA, and JOSE F. Family also not in favor of surgery and would like to time to discuss options. Analgesics and antiemetics PRN  Continue with IV antibiotics - zosyn and vanco   Dr. Xu Echols to follow      Thank you for allowing us to participate in the care of this patient. Total time spent with patient: 30 minutes. Signed By: Brielle Godoy NP     May 31, 2022        ADDENDUM:  Maryellen Quiros MD  Pt seen and examined. Pt had a fall on May 11, 2022 and had right hip operation. Pt also sustained fracture to right forearm and hematoma to right leg. There is a large hematoma to lateral aspect of right leg. No evidence of infection however there is some pressure ischemia of the overlying skin. Recommend OR for evacuation of hematoma but family expressed concern that her mental status has significantly deteriorated since her hip operation on May 12th and would not like her to under go sedation for now. Continue local wound care. Wound will likely open up in the next few days.   Diet as tolerated

## 2022-05-31 NOTE — PROGRESS NOTES
RUR: 16% Medium     MANI: IPR; referral pending with [facility]. Awaiting PT/OT notes for IPR to review. BLS transport will need to be arranged. Follow-up with PCP/specialist.     Primary Contact: Hans Sutton, [phone] or daughter-in-law, Bekah Chang, [phone]    ** PLEASE NOTE:  Patient's son, Gentry Winston will be out of town beginning Saturday, June 4th - Saturday, June 11th. Please contact patient's daughter-in-law, Jodi Ferreira for any updates and/or questions (p: [phone])    * Will need 2nd IM letter prior to discharge. 1:45PM - CM reviewed chart. Per review and ID rounds, plan to continue monitoring hemoglobin and transfuse as needed. Patient remains on IV abx. Per previous CM note, patient admitted from [facility] and prefers to return once medically stable. CM spoke with Adelina Perez [facility] Liaison (p: [phone]) to obtain update on referral.  Per Orvis April, therapy evaluations/notes needed for review. CM spoke with patient, patient's son Latrice Smith) and daughter-in-law Rao Bob) to provide update regarding referral.  Family at bedside aware therapy evaluations are needed for acceptance to return to [facility]. CM will continue to follow as needed.      Wellington Hou, LASHANDA   [phone]

## 2022-05-31 NOTE — PROGRESS NOTES
Physician Progress Note      PATIENT:               Sheri Christopher  CSN #:                  [id number]  :                       1935  ADMIT DATE:       2022 4:14 PM  [address] [place] DATE:  RESPONDING  PROVIDER #:        Naheed Morgan MD          QUERY TEXT:    Patient admitted with [facility], noted to have atrial fibrillation. If possible, please document in progress notes and discharge summary further specificity regarding the type of atrial fibrillation: The medical record reflects the following:  Risk Factors:  Afib    Clinical Indicators:  12 lead EKG   Afib rate 92  rhythm strip   Afib rate 92      Treatment:  Coreg 12.5mg po bid, asa 81mg po qd HOLD    Chronic: nonspecific term that could be referring to paroxysmal, persistent, or permanent  Longstanding persistent: persistent and continuous, lasting > 1 year. Paroxysmal - self-terminating or intermittent; resolves with or without intervention within 7 days of onset; may recur with various frequency. Persistent - Fails to terminate within 7 days; Often requires meds or cardioversion to restore to NSR. Permanent - longstanding & persistent; Medication has been ineffective in restoring NSR &/or cardioversion is contraindicated    Definitions per MS-DRG Training Guide and Quick Reference Guide, Xuan [address] 5 Diseases and Disorders of the Circulatory System; 2019; [employer]. Software content from the [employer]?  [employer].      Thank you,  Pranav Lund RN [facility]  Clinical Documentation  [phone]  Options provided:  -- Paroxysmal Atrial Fibrillation  -- Longstanding Persistent Atrial Fibrillation  -- Permanent Atrial Fibrillation  -- Persistent Atrial Fibrillation  -- Chronic Atrial Fibrillation, unspecified  -- Other - I will add my own diagnosis  -- Disagree - Not applicable / Not valid  -- Disagree - Clinically unable to determine / Unknown  -- Refer to Clinical Documentation Reviewer    PROVIDER RESPONSE TEXT:    Provider is clinically unable to determine a response to this query. Query created by: Roseann Leroy on 5/31/2022 12:17 PM      QUERY TEXT:    Pt admitted with Gi Bleed. Pt noted to have hx of CHF. If possible, please document in progress notes and discharge summary if you are evaluating and/or treating any of the following: The medical record reflects the following:  Risk Factors:  HX CHF, HTN, DM    Clinical Indicators:  5/10/22 admit at [facility] -  Chronic diastolic heart failure    CXR 5/28/22  Lungs are diffusely hazy, difficult to decide if this represents pleural infusion or pulmonary edema. [facility] ED PN 5/28  Her past medical history significant for CHF    Treatment: Coreg 12.5mg po bid, Lasix 20mg iv      Thank you,  Tyrel Rain RN [facility]  Clinical Documentation  [phone]  Options provided:  -- Chronic Systolic CHF/HFrEF  -- Chronic Diastolic CHF/HFpEF  -- Chronic Systolic and Diastolic CHF  -- Other - I will add my own diagnosis  -- Disagree - Not applicable / Not valid  -- Disagree - Clinically unable to determine / Unknown  -- Refer to Clinical Documentation Reviewer    PROVIDER RESPONSE TEXT:    Chronic diastolic CHF per chart review.     Query created by: Roseann Leroy on 5/31/2022 12:45 PM      Electronically signed by:  Jeannie White MD 5/31/2022 4:43 PM

## 2022-05-31 NOTE — PROGRESS NOTES
[address] [facility]  Hospitalist Group                                                                                          Hospitalist Progress Note  Diana Espinoza MD  Answering service: [phone] from in house phone        Date of Service:  2022  NAME:  Domitila Meek  :  1935  MRN:  [id number]      Admission Summary:     Domitila Meek is a 82 y.o. female with past medical history of anxiety, Alzheimer's dementia, hypertension, recent hip replacement right hip on 2022 at [facility], diabetes, UTI, being treated for pneumonia through a PICC line with vancomycin and Zosyn at inpatient rehab. [facility] She was here earlier this morning when they found her to have a substantially sized blood clot in her diaper. She was sent from [facility] due to concern for GI bleed. While here in the ER patient was hemodynamically stable and there were no signs of bleeding so she was monitored and sent back to rehab. Apparently later in the evening the patient presented with another blood clot in her diaper, so the people at [facility] rehab sent her back due to concerns. Her hemoglobin has dropped from about 9.5 to about 9 since the morning. Patient is minimally participative and responsive which is near her baseline from her dementia, but family says that she is more participative usually. They do report that she was kept at [facility] for about 2 weeks and required 2 different transfusions while there. They also report that when she was discharged from [facility] she was discharged without a blood thinner but that she had been started on a blood thinner for apparent DVT prophylaxis at [facility]. I am unable to get a review of systems from the patient due to her not responding to my questions.     In the ER the patient has been hemodynamically stable, with chest x-ray redemonstrating pneumonia, and no signs of bleeding at this time.   I spoke to her family on the telephone and they deny any known issues with fevers, complaints of chest pains, diarrhea, or any other symptoms aside from the bleeding. She has not eaten anything since the morning. Interval history / Subjective:   Patient seen and examined for follow-up of GI bleed. Seen and examined earlier this morning by me. The patient is looking much improved this morning. Patient is much more responsive and answering questions though not always appropriately. Patient reports that she feels a lot better.      Assessment & Plan:       GIB (gastrointestinal bleeding) (5/28/2022)  H&H every 8 hours  Remote telemetry  N.p.o.  Hemodynamically stable at this time  Monitor vitals and labs  Transfuse if hemoglobin less than 7  Hemoglobin trended down to 8.2 but now up to 8.6  Continue to monitor  Hemoglobin dropped to 7.9 this morning and 7.8 later today  Plan is to continue to monitor and transfuse as needed  No plans for procedure GI consult  Protonix 40 mg IV twice daily  Initially hemoglobin remained stable but now has dropped to 7.2, though there could be some factor of dilution  Continue hemoglobin checks  Transfuse as needed    Anemia secondary to blood loss  As above    Right lower extremity hematoma  General surgery is on board     Apparent JOSE F, unknown if CKD  Gentle hydration  Check labs in the morning  Increasing hydration    Hypernatremia  Likely due to dehydration  Increasing fluids to 100 cc/h  Resolved     Pneumonia with right sided PICC  Patient was being treated with vancomycin and Zosyn  Continue antibiotic treatment  Monitor     Alzheimer's dementia  Stable     Diabetes  Sliding scale insulin     Hypertension  Hydralazine IV as needed for blood pressure above 160 for now as patient is n.p.o.     Hypokalemia  Giving IV potassium 20 mEq at this time  Recheck in the morning    Code status: DNR  DVT prophylaxis: Held due to [address] discussed with: Patient/Family and Nurse  Anticipated Disposition: [facility]/Rehab  Anticipated Discharge: 24 hours to 48 hours     Hospital Problems  Date Reviewed: 3/6/2019          Codes Class Noted POA    Pneumonia ICD-10-CM: J18.9  ICD-9-CM: 230  5/30/2022 Unknown        JOSE F (acute kidney injury) [facility]) ICD-10-CM: N17.9  ICD-9-CM: 584.9  5/30/2022 Unknown        GIB (gastrointestinal bleeding) ICD-10-CM: K92.2  ICD-9-CM: 578.9  5/28/2022 Unknown                Review of Systems:   A comprehensive review of systems was negative except for that written in the HPI. Vital Signs:    Last 24hrs VS reviewed since prior progress note. Most recent are:  Visit Vitals  BP (!) 159/87   Pulse 86   Temp 97.7 °F (36.5 °C)   Resp 16   Ht 5' 6\" (1.676 m)   Wt 82.4 kg (181 lb 10.5 oz)   SpO2 93%   BMI 29.32 kg/m²         Intake/Output Summary (Last 24 hours) at 5/31/2022 1604  Last data filed at 5/31/2022 4658  Gross per 24 hour   Intake 500 ml   Output 1100 ml   Net -600 ml        Physical Examination:     I had a face to face encounter with this patient and independently examined them on 5/31/2022 as outlined below:          Constitutional:  No acute distress, wakes up to voice and answers questions. Looks less pale. ENT:  Oral mucosa moist, oropharynx benign. Resp:  CTA bilaterally. No wheezing/rhonchi/rales. No accessory muscle use   CV:  Regular rhythm, normal rate, no murmurs, gallops, rubs    GI:  Soft, non distended, non tender. normoactive bowel sounds, no hepatosplenomegaly     Musculoskeletal:  No edema, warm, 2+ pulses throughout. Right hip surgical area bandaged. Neurologic:  Moves all extremities.   AAOx3, CN II-XII reviewed            Data Review:    Review and/or order of clinical lab test  Review and/or order of tests in the radiology section of CPT  Review and/or order of tests in the medicine section of CPT      Labs:     Recent Labs     05/31/22  0332 05/30/22  2102 05/30/22  1317 05/30/22  0503   WBC 8.9  --   --  8.9   HGB 7.8* 7.6* < > 7.9*   HCT 26.2* 25.5*   < > 26.3*   *  --   --  485*    < > = values in this interval not displayed. Recent Labs     05/31/22  0332 05/30/22  0503 05/29/22 0349    148* 143   K 3.0* 3.2* 3.3*   * 116* 110*   CO2 24 23 24   BUN 34* 45* 53*   CREA 1.04* 1.23* 1.44*   * 224* 194*   CA 8.5 8.3* 8.1*     Recent Labs     05/29/22  0349 05/28/22  1711   ALT 14 17   AP 83 85   TBILI 1.0 1.2*   TP 5.4* 5.9*   ALB 1.7* 1.9*   GLOB 3.7 4.0     Recent Labs     05/28/22  1711   INR 1.2*   PTP 12.4*   APTT 26.5      No results for input(s): FE, TIBC, PSAT, FERR in the last 72 hours. No results found for: FOL, RBCF   No results for input(s): PH, PCO2, PO2 in the last 72 hours. No results for input(s): CPK, CKNDX, TROIQ in the last 72 hours.     No lab exists for component: CPKMB  No results found for: CHOL, CHOLX, CHLST, CHOLV, HDL, HDLP, LDL, LDLC, DLDLP, TGLX, TRIGL, TRIGP, CHHD, CHHDX  Lab Results   Component Value Date/Time    Glucose (POC) 257 (H) 05/31/2022 11:15 AM    Glucose (POC) 226 (H) 05/31/2022 06:09 AM    Glucose (POC) 260 (H) 05/30/2022 09:21 PM    Glucose (POC) 325 (H) 05/30/2022 05:15 PM    Glucose (POC) 229 (H) 05/30/2022 11:23 AM     No results found for: COLOR, APPRN, SPGRU, REFSG, KEANU, PROTU, GLUCU, KETU, BILU, UROU, DILMA, LEUKU, GLUKE, EPSU, BACTU, WBCU, RBCU, CASTS, UCRY      Medications Reviewed:     Current Facility-Administered Medications   Medication Dose Route Frequency    insulin glargine (LANTUS) injection 10 Units  10 Units SubCUTAneous DAILY    [Held by provider] amLODIPine (NORVASC) tablet 10 mg  10 mg Oral DAILY    atorvastatin (LIPITOR) tablet 10 mg  10 mg Oral QHS    carvediloL (COREG) tablet 12.5 mg  12.5 mg Oral BID WITH MEALS    insulin lispro (HUMALOG) injection   SubCUTAneous AC&HS    dextrose 10 % infusion 0-250 mL  0-250 mL IntraVENous PRN    furosemide (LASIX) injection 20 mg  20 mg IntraVENous ONCE    vancomycin (VANCOCIN) 750 mg in 0.9% sodium chloride 250 mL ([id number])  750 mg IntraVENous Q24H    pantoprazole (PROTONIX) 40 mg in 0.9% sodium chloride 10 mL injection  40 mg IntraVENous Q12H    sodium chloride (NS) flush 5-40 mL  5-40 mL IntraVENous Q8H    sodium chloride (NS) flush 5-40 mL  5-40 mL IntraVENous PRN    acetaminophen (TYLENOL) tablet 650 mg  650 mg Oral Q6H PRN    Or    acetaminophen (TYLENOL) suppository 650 mg  650 mg Rectal Q6H PRN    polyethylene glycol (MIRALAX) packet 17 g  17 g Oral DAILY PRN    ondansetron (ZOFRAN ODT) tablet 4 mg  4 mg Oral Q8H PRN    Or    ondansetron (ZOFRAN) injection 4 mg  4 mg IntraVENous Q6H PRN    glucose chewable tablet 16 g  4 Tablet Oral PRN    glucagon (GLUCAGEN) injection 1 mg  1 mg IntraMUSCular PRN    dextrose 10 % infusion 0-250 mL  0-250 mL IntraVENous PRN    hydrALAZINE (APRESOLINE) 20 mg/mL injection 10 mg  10 mg IntraVENous Q6H PRN    0.9% sodium chloride infusion  100 mL/hr IntraVENous CONTINUOUS    Vancomycin - Pharmacy to Dose   Other Rx Dosing/Monitoring    piperacillin-tazobactam (ZOSYN) 3.375 g in 0.9% sodium chloride (MBP/ADV) 100 mL MBP  3.375 g IntraVENous Q8H     ______________________________________________________________________  EXPECTED LENGTH OF STAY: 4d 9h  ACTUAL LENGTH OF STAY:          [address] Radha Shaikh MD

## 2022-06-01 ENCOUNTER — APPOINTMENT (OUTPATIENT)
Dept: GENERAL RADIOLOGY | Age: 87
DRG: 377 | End: 2022-06-01
Attending: STUDENT IN AN ORGANIZED HEALTH CARE EDUCATION/TRAINING PROGRAM
Payer: MEDICARE

## 2022-06-01 LAB
ANION GAP SERPL CALC-SCNC: 6 MMOL/L (ref 5–15)
BASOPHILS # BLD: 0 K/UL (ref 0–0.1)
BASOPHILS NFR BLD: 0 % (ref 0–1)
BUN SERPL-MCNC: 22 MG/DL (ref 6–20)
BUN/CREAT SERPL: 25 (ref 12–20)
CALCIUM SERPL-MCNC: 8.3 MG/DL (ref 8.5–10.1)
CHLORIDE SERPL-SCNC: 111 MMOL/L (ref 97–108)
CO2 SERPL-SCNC: 25 MMOL/L (ref 21–32)
CREAT SERPL-MCNC: 0.88 MG/DL (ref 0.55–1.02)
DIFFERENTIAL METHOD BLD: ABNORMAL
EOSINOPHIL # BLD: 0.3 K/UL (ref 0–0.4)
EOSINOPHIL NFR BLD: 3 % (ref 0–7)
ERYTHROCYTE [DISTWIDTH] IN BLOOD BY AUTOMATED COUNT: 15.7 % (ref 11.5–14.5)
GLUCOSE BLD STRIP.AUTO-MCNC: 138 MG/DL (ref 65–117)
GLUCOSE BLD STRIP.AUTO-MCNC: 139 MG/DL (ref 65–117)
GLUCOSE BLD STRIP.AUTO-MCNC: 159 MG/DL (ref 65–117)
GLUCOSE BLD STRIP.AUTO-MCNC: 98 MG/DL (ref 65–117)
GLUCOSE SERPL-MCNC: 107 MG/DL (ref 65–100)
HCT VFR BLD AUTO: 25.4 % (ref 35–47)
HCT VFR BLD AUTO: 25.4 % (ref 35–47)
HGB BLD-MCNC: 7.5 G/DL (ref 11.5–16)
HGB BLD-MCNC: 7.6 G/DL (ref 11.5–16)
IMM GRANULOCYTES # BLD AUTO: 0.1 K/UL (ref 0–0.04)
IMM GRANULOCYTES NFR BLD AUTO: 1 % (ref 0–0.5)
LYMPHOCYTES # BLD: 0.9 K/UL (ref 0.8–3.5)
LYMPHOCYTES NFR BLD: 11 % (ref 12–49)
MCH RBC QN AUTO: 27.8 PG (ref 26–34)
MCHC RBC AUTO-ENTMCNC: 29.9 G/DL (ref 30–36.5)
MCV RBC AUTO: 93 FL (ref 80–99)
MONOCYTES # BLD: 0.5 K/UL (ref 0–1)
MONOCYTES NFR BLD: 6 % (ref 5–13)
NEUTS SEG # BLD: 6.1 K/UL (ref 1.8–8)
NEUTS SEG NFR BLD: 79 % (ref 32–75)
NRBC # BLD: 0 K/UL (ref 0–0.01)
NRBC BLD-RTO: 0 PER 100 WBC
PLATELET # BLD AUTO: 362 K/UL (ref 150–400)
PMV BLD AUTO: 10.3 FL (ref 8.9–12.9)
POTASSIUM SERPL-SCNC: 3 MMOL/L (ref 3.5–5.1)
RBC # BLD AUTO: 2.73 M/UL (ref 3.8–5.2)
SERVICE CMNT-IMP: ABNORMAL
SERVICE CMNT-IMP: NORMAL
SODIUM SERPL-SCNC: 142 MMOL/L (ref 136–145)
WBC # BLD AUTO: 7.8 K/UL (ref 3.6–11)

## 2022-06-01 PROCEDURE — 74011000258 HC RX REV CODE- 258: Performed by: STUDENT IN AN ORGANIZED HEALTH CARE EDUCATION/TRAINING PROGRAM

## 2022-06-01 PROCEDURE — 99231 SBSQ HOSP IP/OBS SF/LOW 25: CPT | Performed by: CLINICAL NURSE SPECIALIST

## 2022-06-01 PROCEDURE — 74011636637 HC RX REV CODE- 636/637: Performed by: STUDENT IN AN ORGANIZED HEALTH CARE EDUCATION/TRAINING PROGRAM

## 2022-06-01 PROCEDURE — 97535 SELF CARE MNGMENT TRAINING: CPT

## 2022-06-01 PROCEDURE — 36415 COLL VENOUS BLD VENIPUNCTURE: CPT

## 2022-06-01 PROCEDURE — 85025 COMPLETE CBC W/AUTO DIFF WBC: CPT

## 2022-06-01 PROCEDURE — 85018 HEMOGLOBIN: CPT

## 2022-06-01 PROCEDURE — C9113 INJ PANTOPRAZOLE SODIUM, VIA: HCPCS | Performed by: STUDENT IN AN ORGANIZED HEALTH CARE EDUCATION/TRAINING PROGRAM

## 2022-06-01 PROCEDURE — 74011250637 HC RX REV CODE- 250/637: Performed by: STUDENT IN AN ORGANIZED HEALTH CARE EDUCATION/TRAINING PROGRAM

## 2022-06-01 PROCEDURE — 94760 N-INVAS EAR/PLS OXIMETRY 1: CPT

## 2022-06-01 PROCEDURE — 74011000250 HC RX REV CODE- 250: Performed by: STUDENT IN AN ORGANIZED HEALTH CARE EDUCATION/TRAINING PROGRAM

## 2022-06-01 PROCEDURE — 82962 GLUCOSE BLOOD TEST: CPT

## 2022-06-01 PROCEDURE — 99231 SBSQ HOSP IP/OBS SF/LOW 25: CPT | Performed by: SURGERY

## 2022-06-01 PROCEDURE — 97165 OT EVAL LOW COMPLEX 30 MIN: CPT

## 2022-06-01 PROCEDURE — 74011250636 HC RX REV CODE- 250/636: Performed by: STUDENT IN AN ORGANIZED HEALTH CARE EDUCATION/TRAINING PROGRAM

## 2022-06-01 PROCEDURE — 80048 BASIC METABOLIC PNL TOTAL CA: CPT

## 2022-06-01 PROCEDURE — 71045 X-RAY EXAM CHEST 1 VIEW: CPT

## 2022-06-01 PROCEDURE — 65270000046 HC RM TELEMETRY

## 2022-06-01 RX ORDER — POTASSIUM CHLORIDE 14.9 MG/ML
10 INJECTION INTRAVENOUS
Status: DISPENSED | OUTPATIENT
Start: 2022-06-01 | End: 2022-06-01

## 2022-06-01 RX ORDER — CARVEDILOL 6.25 MG/1
12.5 TABLET ORAL 2 TIMES DAILY
Status: DISCONTINUED | OUTPATIENT
Start: 2022-06-01 | End: 2022-06-03 | Stop reason: HOSPADM

## 2022-06-01 RX ADMIN — Medication 2 UNITS: at 12:28

## 2022-06-01 RX ADMIN — SODIUM CHLORIDE, PRESERVATIVE FREE 40 MG: 5 INJECTION INTRAVENOUS at 08:46

## 2022-06-01 RX ADMIN — INSULIN GLARGINE 10 UNITS: 100 INJECTION, SOLUTION SUBCUTANEOUS at 08:46

## 2022-06-01 RX ADMIN — Medication 10 ML: at 14:34

## 2022-06-01 RX ADMIN — Medication 10 ML: at 03:34

## 2022-06-01 RX ADMIN — PIPERACILLIN SODIUM AND TAZOBACTAM SODIUM 3.38 G: 3; 375 INJECTION, POWDER, FOR SOLUTION INTRAVENOUS at 03:31

## 2022-06-01 RX ADMIN — CARVEDILOL 12.5 MG: 6.25 TABLET, FILM COATED ORAL at 17:46

## 2022-06-01 RX ADMIN — POTASSIUM CHLORIDE 10 MEQ: 14.9 INJECTION, SOLUTION INTRAVENOUS at 16:54

## 2022-06-01 RX ADMIN — POTASSIUM CHLORIDE 10 MEQ: 14.9 INJECTION, SOLUTION INTRAVENOUS at 17:55

## 2022-06-01 RX ADMIN — CARVEDILOL 12.5 MG: 6.25 TABLET, FILM COATED ORAL at 08:46

## 2022-06-01 RX ADMIN — SODIUM CHLORIDE, PRESERVATIVE FREE 40 MG: 5 INJECTION INTRAVENOUS at 22:32

## 2022-06-01 RX ADMIN — PIPERACILLIN SODIUM AND TAZOBACTAM SODIUM 3.38 G: 3; 375 INJECTION, POWDER, FOR SOLUTION INTRAVENOUS at 11:34

## 2022-06-01 RX ADMIN — VANCOMYCIN HYDROCHLORIDE 750 MG: 750 INJECTION, POWDER, LYOPHILIZED, FOR SOLUTION INTRAVENOUS at 21:30

## 2022-06-01 RX ADMIN — ATORVASTATIN CALCIUM 10 MG: 10 TABLET, FILM COATED ORAL at 22:31

## 2022-06-01 NOTE — PROGRESS NOTES
Bedside and Verbal shift change report given to Roxane Bullard RN (oncoming nurse) by Irlanda Vail RN (offgoing nurse). Report included the following information SBAR.

## 2022-06-01 NOTE — PROGRESS NOTES
[facility] Adult  Hospitalist Group                                                                                          Hospitalist Progress Note  Marilynn Wiklerson MD  Answering service: [phone] from in house phone        Date of Service:  2022  NAME:  Ramírez Handy  :  1935  MRN:  [id number]      Admission Summary:   Baron Boston a 82 y. o. female with past medical history of anxiety, Alzheimer's dementia, hypertension, recent hip replacement right hip on 2022 at [facility], diabetes, UTI, being treated for pneumonia through a PICC line with vancomycin and Zosyn at inpatient rehab. Peg Daxer comes from [facility] rehab. BABADU was here earlier this morning when they found her to have a substantially sized blood clot in her diaper.  She was sent from [facility] due to concern for GI bleed.  While here in the ER patient was hemodynamically stable and there were no signs of bleeding so she was monitored and sent back to rehab.  Apparently later in the evening the patient presented with another blood clot in her diaper, so the people at [facility] rehab sent her back due to concerns. Asiya Hinkle hemoglobin has dropped from about 9.5 to about 9 since the morning.  Patient is minimally participative and responsive which is near her baseline from her dementia, but family says that she is more participative usually. Mina Couch do report that she was kept at [facility] for about 2 weeks and required 2 different transfusions while there. Mina Couch also report that when she was discharged from [facility] she was discharged without a blood thinner but that she had been started on a blood thinner for apparent DVT prophylaxis at [facility].  I am unable to get a review of systems from the patient due to her not responding to my questions.     In the ER the patient has been hemodynamically stable, with chest x-ray redemonstrating pneumonia, and no signs of bleeding at this time.  I spoke to her family on the telephone and they deny any known issues with fevers, complaints of chest pains, diarrhea, or any other symptoms aside from the bleeding.  She has not eaten anything since the morning. Interval history / Subjective:   Patient seen and examined for follow-up of GI bleed. Seen and examined earlier this morning by me. Patient continues to look well today. Patient sleeping at time of exam but she wakes up easily and says that she feels well. No complaints. No acute events overnight. Assessment & Plan:       GIB (gastrointestinal bleeding) (5/28/2022)  H&H every 8 hours  Remote telemetry  N.p.o.  Hemodynamically stable at this time  Monitor vitals and labs  Transfuse if hemoglobin less than 7  Hemoglobin trended down to 8.2 but now up to 8.6  Continue to monitor  Hemoglobin dropped to 7.9 this morning and 7.8 later today  Plan is to continue to monitor and transfuse as needed  No plans for procedure GI consult  Protonix 40 mg IV twice daily  Initially hemoglobin remained stable but now has dropped to 7.2, though there could be some factor of dilution  Continue hemoglobin checks  Transfuse as needed  Hemoglobin stabilizing. If it stays this way we can stop hemoglobin checks today in the afternoon or tomorrow morning.     Anemia secondary to blood loss  As above     Right lower extremity hematoma  General surgery is on board  Hold on OR for now as family is concerned about a surgery affects on the patient  Pending final recommendations     Apparent JOSE F, unknown if CKD  Gentle hydration  Check labs in the morning  Increasing hydration     Hypernatremia  Likely due to dehydration  Increasing fluids to 100 cc/h  Mild. Likely mostly due to dehydration.   Improved with hydration but we need to be careful with IV fluids as the patient does have history of heart failure.     Pneumonia with right sided PICC  Patient was being treated with vancomycin and Zosyn  Continue antibiotic treatment  Monitor     Alzheimer's dementia  Stable     Diabetes  Sliding scale insulin     Hypertension  Hydralazine IV as needed for blood pressure above 160 for now as patient is n.p.o.     Hypokalemia  Giving IV potassium 20 mEq at this time  Recheck in the morning    The patient has been [facility]. They's are willing to take her back as long as there is no more bleeding. We will continue to watch hemoglobin until fully stable. Code status:  DNR  Prophylaxis: SCD due to GI bleed. Care Plan discussed with: Patient, family, nurse  Anticipated Disposition: 24 to 50 hours     Hospital Problems  Date Reviewed: 3/6/2019          Codes Class Noted POA    Pneumonia ICD-10-CM: J18.9  ICD-9-CM: 762  5/30/2022 Unknown        JOSE F (acute kidney injury) [facility]) ICD-10-CM: N17.9  ICD-9-CM: 584.9  5/30/2022 Unknown        GIB (gastrointestinal bleeding) ICD-10-CM: K92.2  ICD-9-CM: 578.9  5/28/2022 Unknown                Review of Systems:   A comprehensive review of systems was negative except for that written in the HPI. Vital Signs:    Last 24hrs VS reviewed since prior progress note. Most recent are:  Visit Vitals  /80   Pulse 89   Temp 97.4 °F (36.3 °C)   Resp 16   Ht 5' 6\" (1.676 m)   Wt 91.3 kg (201 lb 4.5 oz)   SpO2 95%   BMI 32.49 kg/m²         Intake/Output Summary (Last 24 hours) at 6/1/2022 1535  Last data filed at 6/1/2022 0345  Gross per 24 hour   Intake    Output 1600 ml   Net -1600 ml        Physical Examination:     I had a face to face encounter with this patient and independently examined them on 6/1/2022 as outlined below:    Constitutional:  No acute distress, wakes up to voice and answers questions. Looks less pale. ENT:  Oral mucosa moist, oropharynx benign. Resp:  CTA bilaterally. No wheezing/rhonchi/rales. No accessory muscle use   CV:  Regular rhythm, normal rate, no murmurs, gallops, rubs    GI:  Soft, non distended, non tender.  normoactive bowel sounds, no hepatosplenomegaly     Musculoskeletal:  No edema, warm, 2+ pulses throughout. Right hip surgical area bandaged. Right lower extremity large hematoma with blood accumulation. Not bleeding at this time. Eschar on top of hematoma. Neurologic:  Moves all extremities. AAOx3, CN II-XII reviewed     Data Review:    Review and/or order of clinical lab test  Review and/or order of tests in the radiology section of CPT  Review and/or order of tests in the medicine section of CPT      Labs:     Recent Labs     06/01/22 0331 05/31/22 2014 05/31/22 0332 05/31/22 0332   WBC 7.8  --   --  8.9   HGB 7.6* 7.7*   < > 7.8*   HCT 25.4* 26.3*   < > 26.2*     --   --  460*    < > = values in this interval not displayed. Recent Labs     06/01/22 0331 05/31/22 0332 05/30/22  0503    144 148*   K 3.0* 3.0* 3.2*   * 115* 116*   CO2 25 24 23   BUN 22* 34* 45*   CREA 0.88 1.04* 1.23*   * 233* 224*   CA 8.3* 8.5 8.3*     No results for input(s): ALT, AP, TBIL, TBILI, TP, ALB, GLOB, GGT, AML, LPSE in the last 72 hours. No lab exists for component: SGOT, GPT, AMYP, HLPSE  No results for input(s): INR, PTP, APTT, INREXT in the last 72 hours. No results for input(s): FE, TIBC, PSAT, FERR in the last 72 hours. No results found for: FOL, RBCF   No results for input(s): PH, PCO2, PO2 in the last 72 hours. No results for input(s): CPK, CKNDX, TROIQ in the last 72 hours.     No lab exists for component: CPKMB  No results found for: CHOL, CHOLX, CHLST, CHOLV, HDL, HDLP, LDL, LDLC, DLDLP, TGLX, TRIGL, TRIGP, CHHD, CHHDX  Lab Results   Component Value Date/Time    Glucose (POC) 159 (H) 06/01/2022 12:22 PM    Glucose (POC) 139 (H) 06/01/2022 06:16 AM    Glucose (POC) 111 05/31/2022 10:03 PM    Glucose (POC) 154 (H) 05/31/2022 04:20 PM    Glucose (POC) 257 (H) 05/31/2022 11:15 AM     No results found for: COLOR, APPRN, SPGRU, REFSG, KEANU, PROTU, GLUCU, Jeral Manner, BILU, UROU, DILMA, [address], [address] [place] [address], EPSU, BACTU, WBCU, RBCU, CASTS, UCRY      Medications Reviewed:     Current Facility-Administered Medications   Medication Dose Route Frequency    carvediloL (COREG) tablet 12.5 mg  12.5 mg Oral BID    insulin glargine (LANTUS) injection 10 Units  10 Units SubCUTAneous DAILY    [Held by provider] amLODIPine (NORVASC) tablet 10 mg  10 mg Oral DAILY    atorvastatin (LIPITOR) tablet 10 mg  10 mg Oral QHS    insulin lispro (HUMALOG) injection   SubCUTAneous AC&HS    dextrose 10 % infusion 0-250 mL  0-250 mL IntraVENous PRN    vancomycin (VANCOCIN) 750 mg in 0.9% sodium chloride 250 mL ([id number])  750 mg IntraVENous Q24H    pantoprazole (PROTONIX) 40 mg in 0.9% sodium chloride 10 mL injection  40 mg IntraVENous Q12H    sodium chloride (NS) flush 5-40 mL  5-40 mL IntraVENous Q8H    sodium chloride (NS) flush 5-40 mL  5-40 mL IntraVENous PRN    acetaminophen (TYLENOL) tablet 650 mg  650 mg Oral Q6H PRN    Or    acetaminophen (TYLENOL) suppository 650 mg  650 mg Rectal Q6H PRN    polyethylene glycol (MIRALAX) packet 17 g  17 g Oral DAILY PRN    ondansetron (ZOFRAN ODT) tablet 4 mg  4 mg Oral Q8H PRN    Or    ondansetron (ZOFRAN) injection 4 mg  4 mg IntraVENous Q6H PRN    glucose chewable tablet 16 g  4 Tablet Oral PRN    glucagon (GLUCAGEN) injection 1 mg  1 mg IntraMUSCular PRN    dextrose 10 % infusion 0-250 mL  0-250 mL IntraVENous PRN    hydrALAZINE (APRESOLINE) 20 mg/mL injection 10 mg  10 mg IntraVENous Q6H PRN    0.9% sodium chloride infusion  100 mL/hr IntraVENous CONTINUOUS    Vancomycin - Pharmacy to Dose   Other Rx Dosing/Monitoring    piperacillin-tazobactam (ZOSYN) 3.375 g in 0.9% sodium chloride (MBP/ADV) 100 mL MBP  3.375 g IntraVENous Q8H     ______________________________________________________________________  EXPECTED LENGTH OF STAY: 4d 9h  ACTUAL LENGTH OF STAY:          2                 [facility], MD

## 2022-06-01 NOTE — PROGRESS NOTES
RUR: 15% Medium     MANI: IPR; return to [facility]. BLS transport will need to be arranged. Follow-up with PCP/specialist.      Primary Contact: Sathish Long, [phone] or daughter-in-law, Alfonzo Ellsworth, [phone]     ** PLEASE NOTE:  Patient's son, Francesca Giang will be out of town beginning Saturday, June 4th - Saturday, June 11th. Please contact patient's daughter-in-law, Jose Gabriel for any updates and/or questions (p: [phone])     * Will need 2nd IM letter prior to discharge.      10:50AM - CM reviewed chart. Per review and ID rounds, plan to continue monitoring hemoglobin and transfuse as needed. Patient remains on IV abx. General surgery on board and will continue to monitor right lower anterior leg hematoma; hold on OR for now. CM spoke with Jerry Kelley, [facility] Liaison (p: [phone]) who confirmed they are able to accept patient back once GI bleed has resolved and medically stable. CM sent message to attending via [address] [place] [address]. Per attending, earliest anticipated discharge would be tomorrow; however, discharge pending medical progress.  CM provided update to Aureliano Ospina.     CM will continue to follow as needed.      LASHANDA Gasca   [phone]

## 2022-06-01 NOTE — PROGRESS NOTES
Problem: Falls - Risk of  Goal: *Absence of Falls  Description: Document Jessy Nelson Fall Risk and appropriate interventions in the flowsheet. Outcome: Progressing Towards Goal  Note: Fall Risk Interventions:       Mentation Interventions: Door open when patient unattended    Medication Interventions: Patient to call before getting OOB    Elimination Interventions: Call light in reach    History of Falls Interventions: Door open when patient unattended         Problem: Patient Education: Go to Patient Education Activity  Goal: Patient/Family Education  Outcome: Progressing Towards Goal     Problem: Impaired Skin Integrity/Pressure Injury Treatment  Goal: *Prevention of pressure injury  Description: Document Eusebio Scale and appropriate interventions in the flowsheet. Outcome: Progressing Towards Goal  Note: Pressure Injury Interventions:  Sensory Interventions: Assess need for specialty bed    Moisture Interventions: Absorbent underpads    Activity Interventions: PT/OT evaluation    Mobility Interventions: HOB 30 degrees or less    Nutrition Interventions: Document food/fluid/supplement intake    Friction and Shear Interventions: HOB 30 degrees or less                Problem: Diabetes Self-Management  Goal: *Disease process and treatment process  Description: Define diabetes and identify own type of diabetes; list 3 options for treating diabetes. Outcome: Progressing Towards Goal  Goal: *Incorporating nutritional management into lifestyle  Description: Describe effect of type, amount and timing of food on blood glucose; list 3 methods for planning meals. Outcome: Progressing Towards Goal  Goal: *Incorporating physical activity into lifestyle  Description: State effect of exercise on blood glucose levels.   Outcome: Progressing Towards Goal  Goal: *Developing strategies to promote health/change behavior  Description: Define the ABC's of diabetes; identify appropriate screenings, schedule and personal plan for screenings. Outcome: Progressing Towards Goal  Goal: *Using medications safely  Description: State effect of diabetes medications on diabetes; name diabetes medication taking, action and side effects. Outcome: Progressing Towards Goal  Goal: *Monitoring blood glucose, interpreting and using results  Description: Identify recommended blood glucose targets  and personal targets. Outcome: Progressing Towards Goal  Goal: *Prevention, detection, treatment of acute complications  Description: List symptoms of hyper- and hypoglycemia; describe how to treat low blood sugar and actions for lowering  high blood glucose level. Outcome: Progressing Towards Goal  Goal: *Prevention, detection and treatment of chronic complications  Description: Define the natural course of diabetes and describe the relationship of blood glucose levels to long term complications of diabetes.   Outcome: Progressing Towards Goal  Goal: *Developing strategies to address psychosocial issues  Description: Describe feelings about living with diabetes; identify support needed and support network  Outcome: Progressing Towards Goal  Goal: *Insulin pump training  Outcome: Progressing Towards Goal  Goal: *Sick day guidelines  Outcome: Progressing Towards Goal  Goal: *Patient Specific Goal (EDIT GOAL, INSERT TEXT)  Outcome: Progressing Towards Goal     Problem: Patient Education: Go to Patient Education Activity  Goal: Patient/Family Education  Outcome: Progressing Towards Goal     Problem: Patient Education: Go to Patient Education Activity  Goal: Patient/Family Education  Outcome: Progressing Towards Goal     Problem: Patient Education: Go to Patient Education Activity  Goal: Patient/Family Education  Outcome: Progressing Towards Goal

## 2022-06-01 NOTE — PROGRESS NOTES
General Surgery Daily Progress Note    Admit Date: 2022  Post-Operative Day: * No surgery found * from * No surgery found *     Subjective:     Last 24 hrs:  Patient asleep- arousable-drowsy      Objective:     Blood pressure (!) 158/75, pulse 92, temperature 97.8 °F (36.6 °C), resp. rate 16, height 5' 6\" (1.676 m), weight 201 lb 4.5 oz (91.3 kg), SpO2 93 %. Temp (24hrs), Av.6 °F (36.4 °C), Min:97.2 °F (36.2 °C), Max:97.8 °F (36.6 °C)      _____________________  Physical Exam:     Easily arousable and drowsy in no acute distress. Cardiovascular: RRR-distant Heart sounds, +2 peripheral edema bilateral LE  Lungs:Rhonchi throughout  Right LE: no change to hematoma noted   right lower anterior leg hematoma  9 x 10 x 0 cm, no drainage      Elevated dome with fluctuance  Left open to air  Bruising noted below hematoma as well    Assessment:   Active Problems:    GIB (gastrointestinal bleeding) (2022)      Pneumonia (2022)      JOSE F (acute kidney injury) ([facility] [address] (2022)            Plan:     OR to evacuate suggested by Dr. Wilmar Spain, Family concern about her mental status. Will continue to monitor the hematoma  Wound care- per wound care nurse  Diet as tolerated. Data Review:    Recent Labs     22  1317 22  0503   WBC 7.8  --  8.9  --  8.9   HGB 7.6* 7.7* 7.8*   < > 7.9*   HCT 25.4* 26.3* 26.2*   < > 26.3*     --  460*  --  485*    < > = values in this interval not displayed. Recent Labs     22  03322  0503    144 148*   K 3.0* 3.0* 3.2*   * 115* 116*   CO2 25 24 23   * 233* 224*   BUN 22* 34* 45*   CREA 0.88 1.04* 1.23*   CA 8.3* 8.5 8.3*     No results for input(s): AML, LPSE in the last 72 hours.         ______________________  Medications:    Current Facility-Administered Medications   Medication Dose Route Frequency    carvediloL (COREG) tablet 12.5 mg  12.5 mg Oral BID    insulin glargine (LANTUS) injection 10 Units  10 Units SubCUTAneous DAILY    [Held by provider] amLODIPine (NORVASC) tablet 10 mg  10 mg Oral DAILY    atorvastatin (LIPITOR) tablet 10 mg  10 mg Oral QHS    insulin lispro (HUMALOG) injection   SubCUTAneous AC&HS    dextrose 10 % infusion 0-250 mL  0-250 mL IntraVENous PRN    vancomycin (VANCOCIN) 750 mg in 0.9% sodium chloride 250 mL (Smun8Oil)  750 mg IntraVENous Q24H    pantoprazole (PROTONIX) 40 mg in 0.9% sodium chloride 10 mL injection  40 mg IntraVENous Q12H    sodium chloride (NS) flush 5-40 mL  5-40 mL IntraVENous Q8H    sodium chloride (NS) flush 5-40 mL  5-40 mL IntraVENous PRN    acetaminophen (TYLENOL) tablet 650 mg  650 mg Oral Q6H PRN    Or    acetaminophen (TYLENOL) suppository 650 mg  650 mg Rectal Q6H PRN    polyethylene glycol (MIRALAX) packet 17 g  17 g Oral DAILY PRN    ondansetron (ZOFRAN ODT) tablet 4 mg  4 mg Oral Q8H PRN    Or    ondansetron (ZOFRAN) injection 4 mg  4 mg IntraVENous Q6H PRN    glucose chewable tablet 16 g  4 Tablet Oral PRN    glucagon (GLUCAGEN) injection 1 mg  1 mg IntraMUSCular PRN    dextrose 10 % infusion 0-250 mL  0-250 mL IntraVENous PRN    hydrALAZINE (APRESOLINE) 20 mg/mL injection 10 mg  10 mg IntraVENous Q6H PRN    0.9% sodium chloride infusion  100 mL/hr IntraVENous CONTINUOUS    Vancomycin - Pharmacy to Dose   Other Rx Dosing/Monitoring    piperacillin-tazobactam (ZOSYN) 3.375 g in 0.9% sodium chloride (MBP/ADV) 100 mL MBP  3.375 g IntraVENous Q8H       Los Magana  6/1/2022            I agree w/ the examine and plan by the student  Hold on OR for now

## 2022-06-01 NOTE — DIABETES MGMT
[address] [facility]    CLINICAL NURSE SPECIALIST CONSULT     Initial Presentation   Felipa Kaplan is a 82 y.o. female admitted from rehab with concern for GI bleed after staff noted large blood clot in patient's brief-was initially sent back to rehab after fully evaluated and remained hemodynamically stable. Was subsequently sent back to ED again for same concern after another large blood clot noted in brief.  hgb dropped 9.5 to about 9 since the morning. Patient is minimally participative and responsive which is near her baseline from her dementia-  S/p recent hip surgery on 5/12 and had received 2 blood transfusions while at OSH.     HX:   Past Medical History:   Diagnosis Date    Anxiety     CAD (coronary artery disease)     [facility] [address] [facility]     Dysuria     H/O joint replacement     HTN (hypertension)     Hypercholesteremia     Osteoarthritis     Recurrent UTI     Structural abnormality of bladder     Urinary tract infection without hematuria     site unspecified    UTI (urinary tract infection)         INITIAL DX:   GIB (gastrointestinal bleeding) [K92.2]  JOSE F (acute kidney injury) ([facility] [address] [facility] [N17.9]  Pneumonia [J18.9]     Current Treatment     TX: NPO- now on clears/ gentle hydration    Consulted by Provider for advanced diabetes nursing assessment and care for:     [x] Inpatient management strategy  n    Hospital Course   Clinical progress has been complicated by concern for GIB/ JOSE F/ pneumonia -    6/1[facility]   Diabetes History   DM2-A1C 7.2%- but may not be accurate due to recent blood transfusion during admission for recent hip surgery on 5/12/22    Diabetes-related Medical History  Acute complications  Persistent hyperglycemia  Neurological complications  TBD  Microvascular disease  NONE  Macrovascular disease  CAD  Other associated conditions     HTN    Diabetes Medication History  Key Antihyperglycemic Medications             metFORMIN (GLUCOPHAGE) 500 mg tablet     Insulin Lisp & Lisp Prot, Hum, (HUMALOG MIX 75-25 KWIKPEN) 100 unit/mL (75-25) inpn 10 Units. Key CAD CHF Meds             amLODIPine (NORVASC) 10 mg tablet TAKE ONE TABLET BY MOUTH ONCE DAILY    aspirin delayed-release 81 mg tablet 81 mg.    atorvastatin (LIPITOR) 10 mg tablet TAKE ONE TABLET BY MOUTH EVERY NIGHT AT BEDTIME    carvedilol (COREG) 25 mg tablet TAKE ONE-HALF TABLET BY MOUTH TWICE DAILY    hydrALAZINE (APRESOLINE) 50 mg tablet 50 mg. Diabetes self-management practices:   Eating pattern- unable to obtain from patient     Physical activity pattern-debility/ cognitive dysfunction     Monitoring pattern-per rehab facility   [x] Testing BGs sufficiently to inform self-management adjustments    Taking medications pattern  [x] Consistent administration  [x] Affordable  Overall evaluation:        Subjective   Patient lying in bed   Both sons at bedside at time of visit  Minimal PO intake, <25%. - on clears     Objective   Physical exam  General Normal weight female in no acute distress/ill-appearing. Conversant and cooperative  Neuro  Alert,   Vital Signs   Visit Vitals  BP (!) 148/64   Pulse 87   Temp 97.2 °F (36.2 °C)   Resp 18   Ht 5' 6\" (1.676 m)   Wt 91.3 kg (201 lb 4.5 oz)   SpO2 95%   BMI 32.49 kg/m²     Skin  Warm and dry. Heart   Regular rate and rhythm. No murmurs, rubs or gallops  Lungs  Clear to auscultation without rales or rhonchi  Extremities No foot wounds; right LE wound noted (PTA?) dressed with dry kerlex.  +2 ankle/foot edema noted (PVD?)- another dressing noted on upper right thigh- wound care following;         Laboratory  Recent Labs     06/01/22  0331 05/31/22  0332 05/30/22  0503   * 233* 224*   AGAP 6 5 9   WBC 7.8 8.9 8.9   CREA 0.88 1.04* 1.23*   GFRNA >60 50* 42*       Factors impacting BG management  Factor Dose Comments   Nutrition:  Standard meals- Clears     60 grams/meal      Drugs:    Blood transfusion(s)       Recent s/p surgery on 5/12 @ OSH       A1C will be inaccurate for next 3mo     Infection pna    Other:   Kidney function  Liver function     GFR 50  WNL      Blood glucose pattern      Significant diabetes-related events over the past 24-72 hours  DM2-A1C 7.2%- for age and functional status A1C in goal   BG trends >200 since admission, noted started on low dose basal and correctional insulin regimen-required 10 units correctional in 5/30/22. Assessment and Plan   Nursing Diagnosis Risk for unstable blood glucose pattern   Nursing Intervention Domain 5250 Decision-making Support   Nursing Interventions Examined current inpatient diabetes/blood glucose control   Explored factors facilitating and impeding inpatient management  Explored corrective strategies with patient and responsible inpatient provider   Informed patient of rational for insulin strategy while hospitalized     Nursing Diagnosis 95424 Ineffective Health Management   Nursing Intervention Domain 5250 Decision-makingSupport   Nursing Interventions Identified diabetes self-management practices impeding diabetes control  Discussed diabetes survival skills related to  1. Healthy Plate eating plan; given handouts  2. Role of physical activity in improving insulin sensitivity and action  3. Procedure for blood glucose monitoring & options for low-cost products available from [facility]   4. Medications plan at discharge     Evaluation   Ash Dang is an 81yo  female admittedly with concern for GIB which is resolved per notes. She does live with T2D and PTA is on mixed insulin and daily Metformin. Has required basal and correctional insulin while hospitalized for persistent hyperglycemia. Noted basal dose adjusted for today from 7units to 10 units daily. Will see how this adjustment affects her fasting BG tomorrow before make further recs. Keep -180 while inpatient.      Recommendations     [x] Use of Subcutaneous Insulin Order set (1935)  Insulin Dosing Specific recommendation   CONTINUE Basal                                      (Based on weight, BMI & GFR) 10 units daily-Lantus Advance dose up by 20 % on 6/1/22 if BG >200. MODIFIED Corrective                                       (Useful in adjusting insulin dosing) [x] Normal  Sensitivity-GFR 50          Diabetes Management Team to sign off at this point as patient's blood glucose remains stable on current insulin regimen. Please re-consult us if patient needs change. Thank you for including us in their care. Discharge Planning   1. Discharge to SNF with current PTA diabetes medications  Billing Code(s)   32942  Before making these care recommendations, I personally reviewed the hospitalization record, including notes, laboratory & diagnostic data and current medications, and examined the patient at the bedside (circumstances permitting) before making care recommendations. More than fifty (50) percent of the time was spent in patient counseling and/or care coordination.   Total minutes: [address] DE[address] Hooks [facility] CYNDY Ortez  Diabetes Clinical Nurse Specialist  [facility]  Access via [address] [place] [address]

## 2022-06-01 NOTE — PROGRESS NOTES
Problem: Falls - Risk of  Goal: *Absence of Falls  Description: Document Cindia Neigh Fall Risk and appropriate interventions in the flowsheet. Outcome: Progressing Towards Goal  Note: Fall Risk Interventions:       Mentation Interventions: Door open when patient unattended,Room close to nurse's station,Evaluate medications/consider consulting pharmacy    Medication Interventions: Patient to call before getting OOB    Elimination Interventions: Call light in reach,Patient to call for help with toileting needs    History of Falls Interventions: Door open when patient unattended,Room close to nurse's station         Problem: Patient Education: Go to Patient Education Activity  Goal: Patient/Family Education  Outcome: Progressing Towards Goal     Problem: Impaired Skin Integrity/Pressure Injury Treatment  Goal: *Prevention of pressure injury  Description: Document Eusebio Scale and appropriate interventions in the flowsheet. Outcome: Progressing Towards Goal  Note: Pressure Injury Interventions:  Sensory Interventions: Assess need for specialty bed    Moisture Interventions: Absorbent underpads    Activity Interventions: PT/OT evaluation    Mobility Interventions: HOB 30 degrees or less,Float heels    Nutrition Interventions: Document food/fluid/supplement intake    Friction and Shear Interventions: HOB 30 degrees or less,Apply protective barrier, creams and emollients                Problem: Diabetes Self-Management  Goal: *Disease process and treatment process  Description: Define diabetes and identify own type of diabetes; list 3 options for treating diabetes. Outcome: Progressing Towards Goal  Goal: *Incorporating nutritional management into lifestyle  Description: Describe effect of type, amount and timing of food on blood glucose; list 3 methods for planning meals.   Outcome: Progressing Towards Goal  Goal: *Incorporating physical activity into lifestyle  Description: State effect of exercise on blood glucose levels. Outcome: Progressing Towards Goal  Goal: *Developing strategies to promote health/change behavior  Description: Define the ABC's of diabetes; identify appropriate screenings, schedule and personal plan for screenings. Outcome: Progressing Towards Goal  Goal: *Using medications safely  Description: State effect of diabetes medications on diabetes; name diabetes medication taking, action and side effects. Outcome: Progressing Towards Goal  Goal: *Monitoring blood glucose, interpreting and using results  Description: Identify recommended blood glucose targets  and personal targets. Outcome: Progressing Towards Goal  Goal: *Prevention, detection, treatment of acute complications  Description: List symptoms of hyper- and hypoglycemia; describe how to treat low blood sugar and actions for lowering  high blood glucose level. Outcome: Progressing Towards Goal  Goal: *Prevention, detection and treatment of chronic complications  Description: Define the natural course of diabetes and describe the relationship of blood glucose levels to long term complications of diabetes.   Outcome: Progressing Towards Goal  Goal: *Developing strategies to address psychosocial issues  Description: Describe feelings about living with diabetes; identify support needed and support network  Outcome: Progressing Towards Goal  Goal: *Insulin pump training  Outcome: Progressing Towards Goal  Goal: *Sick day guidelines  Outcome: Progressing Towards Goal  Goal: *Patient Specific Goal (EDIT GOAL, INSERT TEXT)  Outcome: Progressing Towards Goal     Problem: Patient Education: Go to Patient Education Activity  Goal: Patient/Family Education  Outcome: Progressing Towards Goal     Problem: Patient Education: Go to Patient Education Activity  Goal: Patient/Family Education  Outcome: Progressing Towards Goal

## 2022-06-01 NOTE — PROGRESS NOTES
Problem: Self Care Deficits Care Plan (Adult)  Goal: *Acute Goals and Plan of Care (Insert Text)  Description: FUNCTIONAL STATUS PRIOR TO ADMISSION: Per chart review from PT eval: \"patient ambulated with a rolling walker for functional mobility, has a h/o falls w/ injury. \" Admitted from [facility] following proximal femur fx(THR vs cephalo medullary nail) and R radius fx. To [facility] on 5/24. Use of Alec lift at [facility] and son reports good progression. HOME SUPPORT PRIOR TO ADMISSION:  Prior to rehab, the patient lived with her son, able to dress and feed herself, received paid caregiver assist with sink bathing. She attended respite care during the day. Occupational Therapy Goals  Initiated 6/1/2022  1. Patient will perform grooming in supported sitting with supervision/set-up within 7 day(s). 2.  Patient will perform upper body dressing with moderate assistance  within 7 day(s). 3.  Patient will perform supine > sit to participate with ADL task with moderate assistance within 7 day(s). 4.  Patient will perform self feeding with supervision/set-up within 7 day(s). 5.  Patient will participate in upper extremity therapeutic exercise/activities within comfort (R radius fx-in brace)with minimal assistance/contact guard assist for 5 minutes within 7 day(s). Outcome: Not Met   OCCUPATIONAL THERAPY EVALUATION  Patient: Ellyn Penaloza (19 y.o. female)  Date: 6/1/2022  Primary Diagnosis: GIB (gastrointestinal bleeding) [K92.2]  JOSE F (acute kidney injury) ([facility] [address] [N17.9]  Pneumonia [J18.9]        Precautions:  Fall,Other (comment) (Per family RUE NWB w/ brace & RLE WBAT)    ASSESSMENT  Based on the objective data described below, the patient presents with impaired activity tolerance, generalized strength, mobility and balance necessary in ADL tasks s/p admission from [facility] for GI bleed. Recent R hip surgery, R radius fx(brace), PNA with hx of dementia.  Nursing cleared for therapy and reports total A for self feeding this AM.  Received in bed, drowsy. Oriented to self. Hx of dementia. Received with R wrist brace. She participated in grooming task with max A with initial hand over hand to initiate task. Total A to reposition in bed to midline, patient leaning to her L(pillow on R side to offload). Patient drowsy this AM limiting her participation. Anticipate good progression with additional medication care and increased alertness. Current Level of Function Impacting Discharge (ADLs/self-care): feeding total A, grooming max A, bed mob total A    Functional Outcome Measure: The patient scored 0/100 on the Barthel Index outcome measure which is indicative of severe impairment with ADL tasks. Other factors to consider for discharge: Patient was received from [facility] where per PT's note that son reports and good progression at [facility]. Discussion with case mgmt who also reports patient with good progression in IPR. Anticipate good progression with continued skilled OT services to maximize indep and decrease caregiver burden. Patient will benefit from skilled therapy intervention to address the above noted impairments. PLAN :  Recommendations and Planned Interventions: self care training, functional mobility training, therapeutic exercise, balance training, therapeutic activities, endurance activities, patient education, home safety training, and family training/education    Frequency/Duration: Patient will be followed by occupational therapy 5 times a week to address goals.     Recommendation for discharge: (in order for the patient to meet his/her long term goals)  Working towards Therapy 3 hours per day 5-7 days per week    This discharge recommendation:  Has been made in collaboration with the attending provider and/or case management    IF patient discharges home will need the following DME: hospital bed, mechanical lift, walker: rolling, and wheelchair       SUBJECTIVE:   Patient stated [place].     OBJECTIVE DATA SUMMARY:   HISTORY:   Past Medical History:   Diagnosis Date    Anxiety     CAD (coronary artery disease)     [facility] ([facility] [address]     Dysuria     H/O joint replacement     HTN (hypertension)     Hypercholesteremia     Osteoarthritis     Recurrent UTI     Structural abnormality of bladder     Urinary tract infection without hematuria     site unspecified    UTI (urinary tract infection)      Past Surgical History:   Procedure Laterality Date    HX APPENDECTOMY      HX BACK SURGERY      HX CAROTID ENDARTERECTOMY      HX CATARACT REMOVAL      HX FEMUR FRACTURE TX      HX KNEE REPLACEMENT         Expanded or extensive additional review of patient history:     Home Situation  Home Environment: Private residence  # Steps to Enter: 1  One/Two Story Residence: Other (Comment) (tri level)  # of Interior Steps: 5 (5-6 each level)  [employer]: Both  Living Alone: No (l/w son; respite care during the day)  Support Systems: Child(nisha)  Patient Expects to be Discharged to[de-identified] Rehab hospital/unit acute  Current DME Used/Available at Home: Walker, rolling  Tub or Shower Type: Other (comment) (sink bathing with caregiver assistance)    EXAMINATION OF PERFORMANCE DEFICITS:  Cognitive/Behavioral Status:  Neurologic State: Alert  Orientation Level: Oriented to person  Cognition: Memory loss;Decreased attention/concentration             Skin: hematoma R LE    Edema: LEs    Hearing:[place]       Vision/Perceptual:       Unable to test secondary to confusion           Range of Motion:  AROM: Grossly decreased, non-functional  PROM: Generally decreased, functional         Strength:  Strength: Grossly decreased, non-functional                Coordination:  Coordination: Generally decreased, functional  Fine Motor Skills-Upper: Left Intact; Right Intact         Tone & Sensation:  Tone: Normal  Sensation:  (unable to test secondary to AMS)            Functional Mobility and Transfers for ADLs:  Bed Mobility:  Rolling: Total assistance    Transfers:       ADL Assessment:  Feeding: Total assistance    Oral Facial Hygiene/Grooming: Maximum assistance    Bathing: Maximum assistance    Upper Body Dressing: Maximum assistance    Lower Body Dressing: Total assistance    Toileting: Total assistance                ADL Intervention and task modifications:  Feeding  Food to Mouth: Total assistance (dependent) (per nursing)  Drink to Mouth: Total assistance (dependent)    Grooming  Washing Face: Maximum assistance (attempted with both hands)           Functional Measure:    Barthel Index:  Bathin  Bladder: 0  Bowels: 0  Groomin  Dressin  Feedin  Mobility: 0  Stairs: 0  Toilet Use: 0  Transfer (Bed to Chair and Back): 0  Total: 0/100      The Barthel ADL Index: Guidelines  1. The index should be used as a record of what a patient does, not as a record of what a patient could do. 2. The main aim is to establish degree of independence from any help, physical or verbal, however minor and for whatever reason. 3. The need for supervision renders the patient not independent. 4. A patient's performance should be established using the best available evidence. Asking the patient, friends/relatives and nurses are the usual sources, but direct observation and common sense are also important. However direct testing is not needed. 5. Usually the patient's performance over the preceding 24-48 hours is important, but occasionally longer periods will be relevant. 6. Middle categories imply that the patient supplies over 50 per cent of the effort. 7. Use of aids to be independent is allowed. Score Interpretation (from [address] [address])    Independent   60-79 Minimally independent   40-59 Partially dependent   20-39 Very dependent   <20 Totally dependent     -Ashley Ardon, Barthel, D.W. (1965). Functional evaluation: the Barthel Index. [facility] ([address] (1997).  The Barthel activities of daily living index: self-reporting versus actual performance in the old (> or = 75 years). Journal of [address] [place] [address] 457), [facility], JDAVIDF, Laura Mauricio., Alfred Alvarado. (1999). Measuring the change in disability after inpatient rehabilitation; comparison of the responsiveness of the Barthel Index and Functional [place] Measure. Journal of Neurology, Neurosurgery, and Psychiatry, 66(4), 244-840. MILENA Sauer, JEFF Go, & Twyla Park M.A. (2004) Assessment of post-stroke quality of life in cost-effectiveness studies: The usefulness of the Barthel Index and the EuroQoL-5D. Quality of Life Research, 15, 876-26         Occupational Therapy Evaluation Charge Determination   History Examination Decision-Making   MEDIUM Complexity : Expanded review of history including physical, cognitive and psychosocial  history  MEDIUM Complexity : 3-5 performance deficits relating to physical, cognitive , or psychosocial skils that result in activity limitations and / or participation restrictions MEDIUM Complexity : Patient may present with comorbidities that affect occupational performnce. Miniml to moderate modification of tasks or assistance (eg, physical or verbal ) with assesment(s) is necessary to enable patient to complete evaluation       Based on the above components, the patient evaluation is determined to be of the following complexity level: MEDIUM  Pain Rating:  Reports yes when asked to pain, unable to verbalize where. No grimacing noted. Nursing aware    Activity Tolerance:   Poor     HR 84-94 with occasional assessment on tele monitor    After treatment patient left in no apparent distress:    Supine in bed, Patient positioned in L sidelying for pressure relief, Call bell within reach, and Side rails x 3    COMMUNICATION/EDUCATION:   The patients plan of care was discussed with: Registered nurse and Case management.      Patient is unable to participate in goal setting and plan of care. This patients plan of care is appropriate for delegation to [facility].     Thank you for this referral.  Domi Zee, OT  Time Calculation: 16 mins

## 2022-06-02 ENCOUNTER — APPOINTMENT (OUTPATIENT)
Dept: GENERAL RADIOLOGY | Age: 87
DRG: 377 | End: 2022-06-02
Attending: INTERNAL MEDICINE
Payer: MEDICARE

## 2022-06-02 LAB
ANION GAP SERPL CALC-SCNC: 8 MMOL/L (ref 5–15)
BASOPHILS # BLD: 0 K/UL (ref 0–0.1)
BASOPHILS NFR BLD: 1 % (ref 0–1)
BUN SERPL-MCNC: 22 MG/DL (ref 6–20)
BUN/CREAT SERPL: 26 (ref 12–20)
CALCIUM SERPL-MCNC: 8.3 MG/DL (ref 8.5–10.1)
CHLORIDE SERPL-SCNC: 111 MMOL/L (ref 97–108)
CO2 SERPL-SCNC: 24 MMOL/L (ref 21–32)
CREAT SERPL-MCNC: 0.86 MG/DL (ref 0.55–1.02)
DIFFERENTIAL METHOD BLD: ABNORMAL
EOSINOPHIL # BLD: 0.2 K/UL (ref 0–0.4)
EOSINOPHIL NFR BLD: 4 % (ref 0–7)
ERYTHROCYTE [DISTWIDTH] IN BLOOD BY AUTOMATED COUNT: 15.7 % (ref 11.5–14.5)
GLUCOSE BLD STRIP.AUTO-MCNC: 109 MG/DL (ref 65–117)
GLUCOSE BLD STRIP.AUTO-MCNC: 162 MG/DL (ref 65–117)
GLUCOSE BLD STRIP.AUTO-MCNC: 98 MG/DL (ref 65–117)
GLUCOSE SERPL-MCNC: 88 MG/DL (ref 65–100)
HCT VFR BLD AUTO: 25.9 % (ref 35–47)
HCT VFR BLD AUTO: 26.1 % (ref 35–47)
HCT VFR BLD AUTO: 26.8 % (ref 35–47)
HGB BLD-MCNC: 7.7 G/DL (ref 11.5–16)
HGB BLD-MCNC: 7.9 G/DL (ref 11.5–16)
HGB BLD-MCNC: 8.1 G/DL (ref 11.5–16)
IMM GRANULOCYTES # BLD AUTO: 0 K/UL (ref 0–0.04)
IMM GRANULOCYTES NFR BLD AUTO: 1 % (ref 0–0.5)
LYMPHOCYTES # BLD: 0.8 K/UL (ref 0.8–3.5)
LYMPHOCYTES NFR BLD: 13 % (ref 12–49)
MCH RBC QN AUTO: 27.8 PG (ref 26–34)
MCHC RBC AUTO-ENTMCNC: 29.5 G/DL (ref 30–36.5)
MCV RBC AUTO: 94.2 FL (ref 80–99)
MONOCYTES # BLD: 0.5 K/UL (ref 0–1)
MONOCYTES NFR BLD: 7 % (ref 5–13)
NEUTS SEG # BLD: 4.9 K/UL (ref 1.8–8)
NEUTS SEG NFR BLD: 74 % (ref 32–75)
NRBC # BLD: 0.02 K/UL (ref 0–0.01)
NRBC BLD-RTO: 0.3 PER 100 WBC
PLATELET # BLD AUTO: 324 K/UL (ref 150–400)
PMV BLD AUTO: 10.1 FL (ref 8.9–12.9)
POTASSIUM SERPL-SCNC: 3.3 MMOL/L (ref 3.5–5.1)
RBC # BLD AUTO: 2.77 M/UL (ref 3.8–5.2)
SERVICE CMNT-IMP: ABNORMAL
SERVICE CMNT-IMP: NORMAL
SERVICE CMNT-IMP: NORMAL
SODIUM SERPL-SCNC: 143 MMOL/L (ref 136–145)
WBC # BLD AUTO: 6.5 K/UL (ref 3.6–11)

## 2022-06-02 PROCEDURE — 74011250636 HC RX REV CODE- 250/636: Performed by: STUDENT IN AN ORGANIZED HEALTH CARE EDUCATION/TRAINING PROGRAM

## 2022-06-02 PROCEDURE — 94760 N-INVAS EAR/PLS OXIMETRY 1: CPT

## 2022-06-02 PROCEDURE — 80048 BASIC METABOLIC PNL TOTAL CA: CPT

## 2022-06-02 PROCEDURE — 74011000250 HC RX REV CODE- 250: Performed by: STUDENT IN AN ORGANIZED HEALTH CARE EDUCATION/TRAINING PROGRAM

## 2022-06-02 PROCEDURE — C9113 INJ PANTOPRAZOLE SODIUM, VIA: HCPCS | Performed by: STUDENT IN AN ORGANIZED HEALTH CARE EDUCATION/TRAINING PROGRAM

## 2022-06-02 PROCEDURE — 65270000046 HC RM TELEMETRY

## 2022-06-02 PROCEDURE — 71045 X-RAY EXAM CHEST 1 VIEW: CPT

## 2022-06-02 PROCEDURE — 36415 COLL VENOUS BLD VENIPUNCTURE: CPT

## 2022-06-02 PROCEDURE — 85025 COMPLETE CBC W/AUTO DIFF WBC: CPT

## 2022-06-02 PROCEDURE — 85018 HEMOGLOBIN: CPT

## 2022-06-02 PROCEDURE — 74011636637 HC RX REV CODE- 636/637: Performed by: STUDENT IN AN ORGANIZED HEALTH CARE EDUCATION/TRAINING PROGRAM

## 2022-06-02 PROCEDURE — 82962 GLUCOSE BLOOD TEST: CPT

## 2022-06-02 PROCEDURE — 74011250637 HC RX REV CODE- 250/637: Performed by: STUDENT IN AN ORGANIZED HEALTH CARE EDUCATION/TRAINING PROGRAM

## 2022-06-02 PROCEDURE — 97530 THERAPEUTIC ACTIVITIES: CPT

## 2022-06-02 PROCEDURE — 85014 HEMATOCRIT: CPT

## 2022-06-02 RX ADMIN — CARVEDILOL 12.5 MG: 6.25 TABLET, FILM COATED ORAL at 17:48

## 2022-06-02 RX ADMIN — INSULIN GLARGINE 10 UNITS: 100 INJECTION, SOLUTION SUBCUTANEOUS at 09:48

## 2022-06-02 RX ADMIN — SODIUM CHLORIDE, PRESERVATIVE FREE 40 MG: 5 INJECTION INTRAVENOUS at 09:48

## 2022-06-02 RX ADMIN — CARVEDILOL 12.5 MG: 6.25 TABLET, FILM COATED ORAL at 09:49

## 2022-06-02 RX ADMIN — Medication 10 ML: at 22:00

## 2022-06-02 RX ADMIN — Medication 2 UNITS: at 12:07

## 2022-06-02 NOTE — PROGRESS NOTES
[facility] [facility] Adult  Hospitalist Group                                                                                          Hospitalist Progress Note  Lakisha Ellis MD  Answering service: [phone] or [phone] from in house phone        Date of Service:  2022  NAME:  Nubia Charles  :  1935  MRN:  [id number]      Admission Summary:   Sydnie Ovalle a 82 y. o. female with past medical history of anxiety, Alzheimer's dementia, hypertension, recent hip replacement right hip on 2022 at [facility], diabetes, UTI, being treated for pneumonia through a PICC line with vancomycin and Zosyn at inpatient rehab. Alida Madera comes from [facility] rehab. Ceci Mason was here earlier this morning when they found her to have a substantially sized blood clot in her diaper.  She was sent from [facility] due to concern for GI bleed.  While here in the ER patient was hemodynamically stable and there were no signs of bleeding so she was monitored and sent back to rehab.  Apparently later in the evening the patient presented with another blood clot in her diaper, so the people at [facility] rehab sent her back due to concerns. Clive Beltran hemoglobin has dropped from about 9.5 to about 9 since the morning.  Patient is minimally participative and responsive which is near her baseline from her dementia, but family says that she is more participative usually. Musa Yoni do report that she was kept at [facility] for about 2 weeks and required 2 different transfusions while there. Musa Vallejo also report that when she was discharged from [facility] she was discharged without a blood thinner but that she had been started on a blood thinner for apparent DVT prophylaxis at [facility].  I am unable to get a review of systems from the patient due to her not responding to my questions.     In the ER the patient has been hemodynamically stable, with chest x-ray redemonstrating pneumonia, and no signs of bleeding at this time.  I spoke to her family on the telephone and they deny any known issues with fevers, complaints of chest pains, diarrhea, or any other symptoms aside from the bleeding.  She has not eaten anything since the morning. Interval history / Subjective:   No episodes of bleeding overnight. No compalints. Remains confused. Oriented to self only. Plan for bedside debridement of hematoma per gen surgery today      Assessment & Plan:     UGIB - stable. No evidence of active bleeding since admission    Acute blood loss anemia   - Transfuse if hemoglobin less than 7  - Continue to monitor, has not needed any PRBC transfusions.   - GI consulted, and no plans for intervention   - PPI BID IV --> to PO tomorrow      Right lower extremity hematoma with central necrosis   - General surgery is on board, family declining OR   - Planned bedside procedure with gen surgery today      JOSE F - Cr up to 1.6 on admit, now improved  - DC IVF    - BMP daily      Hypernatremia - improved  - DC IVF and monitor      Pneumonia with right sided PICC - completed treatment   - suggest pulling PICC at DC     Alzheimer's dementia  Stable     Diabetes - A1c 7.2%  - SSI, POCT glucose checks and hypoglycemia protocol      Hypertension - BP stable, may need to resume oral meds tomorrow   Hypokalemia - replete and monitor        Code status:  DNR  Prophylaxis: SCD due to GI bleed. Care Plan discussed with: Patient, family, nurse  Anticipated Disposition: 24 to 50 hours     Hospital Problems  Date Reviewed: 3/6/2019          Codes Class Noted POA    Pneumonia ICD-10-CM: J18.9  ICD-9-CM: 141  5/30/2022 Unknown        JOSE F (acute kidney injury) [facility]) ICD-10-CM: N17.9  ICD-9-CM: 584.9  5/30/2022 Unknown        GIB (gastrointestinal bleeding) ICD-10-CM: K92.2  ICD-9-CM: 578.9  5/28/2022 Unknown                Review of Systems:   A comprehensive review of systems was negative except for that written in the HPI.        Vital Signs:    Last 24hrs VS reviewed since prior progress note. Most recent are:  Visit Vitals  BP (!) 151/79   Pulse 80   Temp 97.8 °F (36.6 °C)   Resp 16   Ht 5' 6\" (1.676 m)   Wt 89.7 kg (197 lb 12 oz)   SpO2 96%   BMI 31.92 kg/m²         Intake/Output Summary (Last 24 hours) at 6/2/2022 1733  Last data filed at 6/2/2022 2183  Gross per 24 hour   Intake --   Output 550 ml   Net -550 ml        Physical Examination:     I had a face to face encounter with this patient and independently examined them on 6/2/2022 as outlined below:    Constitutional:  No acute distress, wakes up to voice and answers questions. Chronically ill appearing, elderly    ENT:  Oral mucosa moist, oropharynx benign. Resp:  CTA bilaterally. No wheezing/rhonchi/rales. No accessory muscle use   CV:  Regular rhythm, normal rate, no murmurs, gallops, rubs    GI:  Soft, non distended, non tender. normoactive bowel sounds, no hepatosplenomegaly     Musculoskeletal:  No edema, warm, 2+ pulses throughout. Right hip surgical area bandaged. Right lower extremity large hematoma with blood accumulation. Not bleeding at this time. Eschar on top of hematoma. Neurologic:  Moves all extremities. AAOx1 (self), CN II-XII reviewed     Data Review:    Review and/or order of clinical lab test  Review and/or order of tests in the radiology section of CPT  Review and/or order of tests in the medicine section of CPT      Labs:     Recent Labs     06/02/22  0843 06/02/22  0032 06/01/22  1512 06/01/22 0331   WBC  --  6.5  --  7.8   HGB 8.1* 7.7*   < > 7.6*   HCT 26.8* 26.1*   < > 25.4*   PLT  --  324  --  362    < > = values in this interval not displayed. Recent Labs     06/02/22  0032 06/01/22  0331 05/31/22  0332    142 144   K 3.3* 3.0* 3.0*   * 111* 115*   CO2 24 25 24   BUN 22* 22* 34*   CREA 0.86 0.88 1.04*   GLU 88 107* 233*   CA 8.3* 8.3* 8.5     No results for input(s): ALT, AP, TBIL, TBILI, TP, ALB, GLOB, GGT, AML, LPSE in the last 72 hours.     No lab exists for component: SGOT, GPT, AMYP, HLPSE  No results for input(s): INR, PTP, APTT, INREXT, INREXT in the last 72 hours. No results for input(s): FE, TIBC, PSAT, FERR in the last 72 hours. No results found for: FOL, RBCF   No results for input(s): PH, PCO2, PO2 in the last 72 hours. No results for input(s): CPK, CKNDX, TROIQ in the last 72 hours.     No lab exists for component: CPKMB  No results found for: CHOL, CHOLX, CHLST, CHOLV, HDL, HDLP, LDL, LDLC, DLDLP, TGLX, TRIGL, TRIGP, CHHD, CHHDX  Lab Results   Component Value Date/Time    Glucose (POC) 109 06/02/2022 04:32 PM    Glucose (POC) 162 (H) 06/02/2022 11:58 AM    Glucose (POC) 98 06/02/2022 06:29 AM    Glucose (POC) 98 06/01/2022 09:40 PM    Glucose (POC) 138 (H) 06/01/2022 04:09 PM     No results found for: COLOR, APPRN, SPGRU, REFSG, KEANU, PROTU, GLUCU, KETU, BILU, UROU, DILMA, LEUKU, GLUKE, EPSU, BACTU, WBCU, RBCU, CASTS, UCRY      Medications Reviewed:     Current Facility-Administered Medications   Medication Dose Route Frequency    carvediloL (COREG) tablet 12.5 mg  12.5 mg Oral BID    insulin glargine (LANTUS) injection 10 Units  10 Units SubCUTAneous DAILY    [Held by provider] amLODIPine (NORVASC) tablet 10 mg  10 mg Oral DAILY    atorvastatin (LIPITOR) tablet 10 mg  10 mg Oral QHS    insulin lispro (HUMALOG) injection   SubCUTAneous AC&HS    dextrose 10 % infusion 0-250 mL  0-250 mL IntraVENous PRN    pantoprazole (PROTONIX) 40 mg in 0.9% sodium chloride 10 mL injection  40 mg IntraVENous Q12H    sodium chloride (NS) flush 5-40 mL  5-40 mL IntraVENous Q8H    sodium chloride (NS) flush 5-40 mL  5-40 mL IntraVENous PRN    acetaminophen (TYLENOL) tablet 650 mg  650 mg Oral Q6H PRN    Or    acetaminophen (TYLENOL) suppository 650 mg  650 mg Rectal Q6H PRN    polyethylene glycol (MIRALAX) packet 17 g  17 g Oral DAILY PRN    ondansetron (ZOFRAN ODT) tablet 4 mg  4 mg Oral Q8H PRN    Or    ondansetron (ZOFRAN) injection 4 mg  4 mg IntraVENous Q6H PRN    glucose chewable tablet 16 g  4 Tablet Oral PRN    glucagon (GLUCAGEN) injection 1 mg  1 mg IntraMUSCular PRN    dextrose 10 % infusion 0-250 mL  0-250 mL IntraVENous PRN    hydrALAZINE (APRESOLINE) 20 mg/mL injection 10 mg  10 mg IntraVENous Q6H PRN    0.9% sodium chloride infusion  100 mL/hr IntraVENous CONTINUOUS     ______________________________________________________________________  EXPECTED LENGTH OF STAY: 4d 9h  ACTUAL LENGTH OF STAY:          3                 Annie Nagy MD

## 2022-06-02 NOTE — PROGRESS NOTES
Bedside and Verbal shift change report given to Shante Marr RN  (oncoming nurse) by Mino Cox RN (offgoing nurse). Report included the following information SBAR.

## 2022-06-02 NOTE — PROGRESS NOTES
Problem: Mobility Impaired (Adult and Pediatric)  Goal: *Acute Goals and Plan of Care (Insert Text)  Description: FUNCTIONAL STATUS PRIOR TO ADMISSION: Patient ambulated with a rolling walker for functional mobility, has a h/o falls w/ injury. HOME SUPPORT PRIOR TO ADMISSION:   The patient is from [facility]. Prior to rehab, the patient lived with her son, able to dress and feed herself, received paid caregiver assist with sink bathing. She attended respite care during the day. Physical Therapy Goals  Initiated 5/31/2022  1. Patient will move from supine to sit and sit to supine in bed with moderate assistance within 7 day(s). 2.  Patient will transfer from bed to chair and chair to bed with moderate assistance using the least restrictive device within 7 day(s). 3.  Patient will perform sit to stand with moderate assistance  within 7 day(s). 4.  Patient will perform exercises with minimal assistance/contact guard assist within 7 days. Outcome: Progressing Towards Goal   PHYSICAL THERAPY TREATMENT  Patient: Félix Shah (61 y.o. female)  Date: 6/2/2022  Diagnosis: GIB (gastrointestinal bleeding) [K92.2]  JOSE F (acute kidney injury) ([facility] [address] [N17.9]  Pneumonia [J18.9] <principal problem not specified>       Precautions: Fall,Other (comment) (Per family RUE NWB w/ brace & RLE WBAT)  Chart, physical therapy assessment, plan of care and goals were reviewed. ASSESSMENT  Patient continues with skilled PT services and is slowly progressing towards goals. Pt participating in [facility] with verbal and tactile cues - pt falling asleep throughout treatment. Pt repositioned to elevated right UE - hand - wrist splint in place and both feet off loaded. Pt tends to position with right leg internally rotated and adducted - repositioned with full leg supported with blanket roll to align leg in more neutral position. Pt continues with loose cough, pt unable to clear sputum - pt with pneumonia. Recommend bed in chair position for meals. Current Level of Function Impacting Discharge (mobility/balance): Total assistance     Other factors to consider for discharge: pt to return to [facility] when stable         PLAN :  Patient continues to benefit from skilled intervention to address the above impairments. Continue treatment per established plan of care. to address goals. Recommendation for discharge: (in order for the patient to meet his/her long term goals)  Therapy 3 hours per day 5-7 days per week    This discharge recommendation:  Has been made in collaboration with the attending provider and/or case management    IF patient discharges home will need the following DME: n/a       SUBJECTIVE:   Patient stated yes.   No spontaneous speech - answers questions 1 - 2 words    OBJECTIVE DATA SUMMARY:   Critical Behavior:  Neurologic State: Confused  Orientation Level: Disoriented to place,Disoriented to situation,Disoriented to time  Cognition: Decreased attention/concentration     Functional Mobility Training:  Bed Mobility:  Rolling: Total assistance    Therapeutic Exercises/Activity:   Pt performed AAROM all fours except right wrist - splint in place. Pt with minimal movement right leg - no c/o pain with PROM. Positioning - positioned right arm with forearm supported and elevated on pillows secondary to right finger swelling. Left leg supported on pillow lower leg with left heel off loaded. Right leg elevated on pillows - starting from hip with blanket roll used to limit internal rotation/adduction with right foot elevated higher than hip. Pt with loose cough x 3 during session - unable to clear secretions - nursing notified. Pt would benefit from right foot higher than heart - however, due to ? Congestion - pt left with HOB elevated. Skin:  large hematoma noted right lateral calf - center area of black ? Eschar ? Pressure necrosis - per nursing - MD to drain today.       Pain Rating:  Pt unable to rate pain. Activity Tolerance:   Poor - remains in bed - per chart used Alec Lift to facility - recommend bed in chair position 3 x day for meals (liquid diet - total assist for feeding). After treatment patient left in no apparent distress:   Heels elevated for pressure relief, Call bell within reach, Bed / chair alarm activated, and Side rails x 3    COMMUNICATION/COLLABORATION:   The patients plan of care was discussed with: Registered nurse.      Danielle Jensen, PT   Time Calculation: 21 mins

## 2022-06-02 NOTE — PROGRESS NOTES
Perfect serve message sent to Dr Pauline Gutierres about pt having some new congestion, course lung sounds, congested weak cough. Earlier pt was not noted to have cough and lungs sounded much clearer. MD placed order for D/C of IV fluids and for chest xray. 1915 -- I have read and agree with documentation completed by [facility].

## 2022-06-02 NOTE — PROGRESS NOTES
Bedside and Verbal shift change report given to Oswald RN  (oncoming nurse) by Em Pacheco (offgoing nurse). Report included the following information SBAR and Kardex.

## 2022-06-02 NOTE — PROGRESS NOTES
Progress Note    Patient: Blayne Saavedra MRN: [id number]  SSN: [id number]    YOB: 1935  Age: 82 y.o. Sex: female      Admit Date: 2022    Right leg hematoma    Subjective:     No acute surgical issues. Pt reported mild pain to right leg. Hematoma with minimal weeping of liquified venous fluid. Objective:     Visit Vitals  /80   Pulse 92   Temp 97.4 °F (36.3 °C)   Resp 16   Ht 5' 6\" (1.676 m)   Wt 201 lb 4.5 oz (91.3 kg)   SpO2 95%   BMI 32.49 kg/m²       Temp (24hrs), Av.4 °F (36.3 °C), Min:97.2 °F (36.2 °C), Max:97.8 °F (36.6 °C)        Physical Exam:    Gen:  NAD  Pulm:  Unlabored  RLE:  Liquified hematoma with pressure necrosis of overlying skin    Recent Results (from the past 24 hour(s))   GLUCOSE, POC    Collection Time: 22 10:03 PM   Result Value Ref Range    Glucose (POC) 111 65 - 117 mg/dL    Performed by Matt Roy    CBC WITH AUTOMATED DIFF    Collection Time: 22  3:31 AM   Result Value Ref Range    WBC 7.8 3.6 - 11.0 K/uL    RBC 2.73 (L) 3.80 - 5.20 M/uL    HGB 7.6 (L) 11.5 - 16.0 g/dL    HCT 25.4 (L) 35.0 - 47.0 %    MCV 93.0 80.0 - 99.0 FL    MCH 27.8 26.0 - 34.0 PG    MCHC 29.9 (L) 30.0 - 36.5 g/dL    RDW 15.7 (H) 11.5 - 14.5 %    PLATELET 150 087 - 982 K/uL    MPV 10.3 8.9 - 12.9 FL    NRBC 0.0 0  WBC    ABSOLUTE NRBC 0.00 0.00 - 0.01 K/uL    NEUTROPHILS 79 (H) 32 - 75 %    LYMPHOCYTES 11 (L) 12 - 49 %    MONOCYTES 6 5 - 13 %    EOSINOPHILS 3 0 - 7 %    BASOPHILS 0 0 - 1 %    IMMATURE GRANULOCYTES 1 (H) 0.0 - 0.5 %    ABS. NEUTROPHILS 6.1 1.8 - 8.0 K/UL    ABS. LYMPHOCYTES 0.9 0.8 - 3.5 K/UL    ABS. MONOCYTES 0.5 0.0 - 1.0 K/UL    ABS. EOSINOPHILS 0.3 0.0 - 0.4 K/UL    ABS. BASOPHILS 0.0 0.0 - 0.1 K/UL    ABS. IMM.  GRANS. 0.1 (H) 0.00 - 0.04 K/UL    DF AUTOMATED     METABOLIC PANEL, BASIC    Collection Time: 22  3:31 AM   Result Value Ref Range    Sodium 142 136 - 145 mmol/L    Potassium 3.0 (L) 3.5 - 5.1 mmol/L    Chloride 111 (H) 97 - 108 mmol/L    CO2 25 21 - 32 mmol/L    Anion gap 6 5 - 15 mmol/L    Glucose 107 (H) 65 - 100 mg/dL    BUN 22 (H) 6 - 20 MG/DL    Creatinine 0.88 0.55 - 1.02 MG/DL    BUN/Creatinine ratio 25 (H) 12 - 20      GFR est AA >60 >60 ml/min/1.73m2    GFR est non-AA >60 >60 ml/min/1.73m2    Calcium 8.3 (L) 8.5 - 10.1 MG/DL   GLUCOSE, POC    Collection Time: 06/01/22  6:16 AM   Result Value Ref Range    Glucose (POC) 139 (H) 65 - 117 mg/dL    Performed by Kristal Maria    GLUCOSE, POC    Collection Time: 06/01/22 12:22 PM   Result Value Ref Range    Glucose (POC) 159 (H) 65 - 117 mg/dL    Performed by Cecilio Benavides RN    HGB & HCT    Collection Time: 06/01/22  3:12 PM   Result Value Ref Range    HGB 7.5 (L) 11.5 - 16.0 g/dL    HCT 25.4 (L) 35.0 - 47.0 %   GLUCOSE, POC    Collection Time: 06/01/22  4:09 PM   Result Value Ref Range    Glucose (POC) 138 (H) 65 - 117 mg/dL    Performed by Saman Saldana            Assessment:     Hospital Problems  Date Reviewed: 3/6/2019          Codes Class Noted POA    Pneumonia ICD-10-CM: J18.9  ICD-9-CM: 535  5/30/2022 Unknown        [facility]) [facility] ICD-10-CM: N17.9  ICD-9-CM: 584.9  5/30/2022 Unknown        GIB (gastrointestinal bleeding) ICD-10-CM: K92.2  ICD-9-CM: 578.9  5/28/2022 Unknown              Plan/Recommendations/Medical Decision Making:     RLE hematoma:  Continue to monitor. Family does not currently want an operation due to anesthesia. Will see if hematoma can be lanced at bed side tomorrow.

## 2022-06-03 VITALS
WEIGHT: 197.75 LBS | DIASTOLIC BLOOD PRESSURE: 68 MMHG | TEMPERATURE: 98.8 F | BODY MASS INDEX: 31.78 KG/M2 | OXYGEN SATURATION: 96 % | SYSTOLIC BLOOD PRESSURE: 144 MMHG | HEART RATE: 75 BPM | RESPIRATION RATE: 16 BRPM | HEIGHT: 66 IN

## 2022-06-03 LAB
ANION GAP SERPL CALC-SCNC: 8 MMOL/L (ref 5–15)
BASOPHILS # BLD: 0 K/UL (ref 0–0.1)
BASOPHILS NFR BLD: 1 % (ref 0–1)
BUN SERPL-MCNC: 21 MG/DL (ref 6–20)
BUN/CREAT SERPL: 24 (ref 12–20)
CALCIUM SERPL-MCNC: 8.3 MG/DL (ref 8.5–10.1)
CHLORIDE SERPL-SCNC: 111 MMOL/L (ref 97–108)
CO2 SERPL-SCNC: 24 MMOL/L (ref 21–32)
CREAT SERPL-MCNC: 0.87 MG/DL (ref 0.55–1.02)
DIFFERENTIAL METHOD BLD: ABNORMAL
EOSINOPHIL # BLD: 0.3 K/UL (ref 0–0.4)
EOSINOPHIL NFR BLD: 4 % (ref 0–7)
ERYTHROCYTE [DISTWIDTH] IN BLOOD BY AUTOMATED COUNT: 15.8 % (ref 11.5–14.5)
GLUCOSE BLD STRIP.AUTO-MCNC: 162 MG/DL (ref 65–117)
GLUCOSE BLD STRIP.AUTO-MCNC: 81 MG/DL (ref 65–117)
GLUCOSE BLD STRIP.AUTO-MCNC: 96 MG/DL (ref 65–117)
GLUCOSE SERPL-MCNC: 86 MG/DL (ref 65–100)
HCT VFR BLD AUTO: 26.5 % (ref 35–47)
HCT VFR BLD AUTO: 27.1 % (ref 35–47)
HGB BLD-MCNC: 8 G/DL (ref 11.5–16)
HGB BLD-MCNC: 8.2 G/DL (ref 11.5–16)
IMM GRANULOCYTES # BLD AUTO: 0.1 K/UL (ref 0–0.04)
IMM GRANULOCYTES NFR BLD AUTO: 1 % (ref 0–0.5)
LYMPHOCYTES # BLD: 0.9 K/UL (ref 0.8–3.5)
LYMPHOCYTES NFR BLD: 14 % (ref 12–49)
MAGNESIUM SERPL-MCNC: 1.9 MG/DL (ref 1.6–2.4)
MCH RBC QN AUTO: 28.2 PG (ref 26–34)
MCHC RBC AUTO-ENTMCNC: 30.2 G/DL (ref 30–36.5)
MCV RBC AUTO: 93.3 FL (ref 80–99)
MONOCYTES # BLD: 0.5 K/UL (ref 0–1)
MONOCYTES NFR BLD: 8 % (ref 5–13)
NEUTS SEG # BLD: 4.9 K/UL (ref 1.8–8)
NEUTS SEG NFR BLD: 72 % (ref 32–75)
NRBC # BLD: 0.02 K/UL (ref 0–0.01)
NRBC BLD-RTO: 0.3 PER 100 WBC
PHOSPHATE SERPL-MCNC: 2.5 MG/DL (ref 2.6–4.7)
PLATELET # BLD AUTO: 305 K/UL (ref 150–400)
PMV BLD AUTO: 10.3 FL (ref 8.9–12.9)
POTASSIUM SERPL-SCNC: 3.2 MMOL/L (ref 3.5–5.1)
PROCALCITONIN SERPL-MCNC: <0.05 NG/ML
RBC # BLD AUTO: 2.84 M/UL (ref 3.8–5.2)
SERVICE CMNT-IMP: ABNORMAL
SERVICE CMNT-IMP: NORMAL
SERVICE CMNT-IMP: NORMAL
SODIUM SERPL-SCNC: 143 MMOL/L (ref 136–145)
WBC # BLD AUTO: 6.6 K/UL (ref 3.6–11)

## 2022-06-03 PROCEDURE — 74011250636 HC RX REV CODE- 250/636: Performed by: STUDENT IN AN ORGANIZED HEALTH CARE EDUCATION/TRAINING PROGRAM

## 2022-06-03 PROCEDURE — 80048 BASIC METABOLIC PNL TOTAL CA: CPT

## 2022-06-03 PROCEDURE — 84145 PROCALCITONIN (PCT): CPT

## 2022-06-03 PROCEDURE — 85018 HEMOGLOBIN: CPT

## 2022-06-03 PROCEDURE — 74011000250 HC RX REV CODE- 250: Performed by: STUDENT IN AN ORGANIZED HEALTH CARE EDUCATION/TRAINING PROGRAM

## 2022-06-03 PROCEDURE — C9113 INJ PANTOPRAZOLE SODIUM, VIA: HCPCS | Performed by: STUDENT IN AN ORGANIZED HEALTH CARE EDUCATION/TRAINING PROGRAM

## 2022-06-03 PROCEDURE — 83735 ASSAY OF MAGNESIUM: CPT

## 2022-06-03 PROCEDURE — 84100 ASSAY OF PHOSPHORUS: CPT

## 2022-06-03 PROCEDURE — 74011250637 HC RX REV CODE- 250/637: Performed by: STUDENT IN AN ORGANIZED HEALTH CARE EDUCATION/TRAINING PROGRAM

## 2022-06-03 PROCEDURE — 74011250637 HC RX REV CODE- 250/637: Performed by: INTERNAL MEDICINE

## 2022-06-03 PROCEDURE — 82962 GLUCOSE BLOOD TEST: CPT

## 2022-06-03 PROCEDURE — 74011636637 HC RX REV CODE- 636/637: Performed by: STUDENT IN AN ORGANIZED HEALTH CARE EDUCATION/TRAINING PROGRAM

## 2022-06-03 PROCEDURE — 85025 COMPLETE CBC W/AUTO DIFF WBC: CPT

## 2022-06-03 PROCEDURE — 36415 COLL VENOUS BLD VENIPUNCTURE: CPT

## 2022-06-03 RX ORDER — PANTOPRAZOLE SODIUM 40 MG/1
40 TABLET, DELAYED RELEASE ORAL
Status: DISCONTINUED | OUTPATIENT
Start: 2022-06-03 | End: 2022-06-03 | Stop reason: HOSPADM

## 2022-06-03 RX ORDER — PANTOPRAZOLE SODIUM 40 MG/1
40 TABLET, DELAYED RELEASE ORAL
Qty: 60 TABLET | Refills: 0 | Status: SHIPPED | OUTPATIENT
Start: 2022-06-03 | End: 2022-07-03

## 2022-06-03 RX ORDER — SODIUM,POTASSIUM PHOSPHATES 280-250MG
2 POWDER IN PACKET (EA) ORAL ONCE
Status: COMPLETED | OUTPATIENT
Start: 2022-06-03 | End: 2022-06-03

## 2022-06-03 RX ADMIN — INSULIN GLARGINE 10 UNITS: 100 INJECTION, SOLUTION SUBCUTANEOUS at 11:50

## 2022-06-03 RX ADMIN — POTASSIUM & SODIUM PHOSPHATES POWDER PACK 280-160-250 MG 2 PACKET: 280-160-250 PACK at 08:49

## 2022-06-03 RX ADMIN — SODIUM CHLORIDE, PRESERVATIVE FREE 40 MG: 5 INJECTION INTRAVENOUS at 08:49

## 2022-06-03 RX ADMIN — Medication 10 ML: at 06:00

## 2022-06-03 RX ADMIN — SODIUM CHLORIDE, PRESERVATIVE FREE 40 MG: 5 INJECTION INTRAVENOUS at 00:00

## 2022-06-03 RX ADMIN — ATORVASTATIN CALCIUM 10 MG: 10 TABLET, FILM COATED ORAL at 00:00

## 2022-06-03 RX ADMIN — CARVEDILOL 12.5 MG: 6.25 TABLET, FILM COATED ORAL at 08:49

## 2022-06-03 NOTE — WOUND CARE
At bedside during I & D of right lower leg by Dr. Jose Simmons. Patient tolerated procedure very well, a large amount of gelatinous hematoma was evacuated from the wound. Incision measures 5.2 cm x 1.5 cm x 1.6 with circumferential undermining with deepest at 3 o'clock being 4 cm, wound bed still has some scattered hematoma and an area of exposed fascia, jason-wound with ecchymosis. Recommendation:  Right lower leg- Daily cleanse with normal saline, wipe wound bed clean including areas of undermining, loosely fill with roll gauze that has been moistened with normal saline, cover with 4 x 4's and ABD pad, secure with roll gauze and tape.     GADIEL HindsN, RN, [facility]  Office x [phone]  Pager x [employer] unknown

## 2022-06-03 NOTE — PROGRESS NOTES
RUR: 15% Medium     MANI: Return to [facility]. [facility] transport scheduled for today, Friday, 6/3 @ 1:30PM. Follow-up with PCP/specialist.      Primary Contact: SonAnu, [phone] or daughter-in-law, Rafaela Long, [phone]     ** PLEASE NOTE:  Patient's sonAnu will be out of town beginning Saturday, June 4th - Saturday, June 11th. Please contact patient's daughter-in-law, Lexy for any updates and/or questions (p: [phone]).    Medicare pt has received, reviewed, and signed 2nd IM letter informing them of their right to appeal the discharge. Signed copied has been placed on pt bedside chart. 11:15AM -  noted discharge order. Patient is medically stable for discharge. CM spoke with Nupur Espino (p: [phone]) who confirmed they have an available bed. Patient will admit to [facility] today, Friday, 6/3. [facility] scheduled for today @ 1:30PM. CM and attending provided update to [facility] via phone. [facility] requested for CM to contact his wife, Lexy to provide update in order for wife to send out group text message to family with update. CM provided update to [place]. Family and patient in agreement with discharge plan. EMTALA form signed by attending and placed on patient's hard chart, along with ambulance form and discharge packet. Nursing to call report to [phone]. Room # unable to be provided until patient admits to [facility]. Nursing made aware. Inpatient Rehab/ Hospital to Hospital Transition of Care Note /Discharge Note     EMTALA filed and ready for MD to sign at bedside chart. Ambulance packet at bedside chart.       Accepting Physician: Dr. Enmanuel Jain   Discharging Physician: Dr. Danny Frank   Accepting Representative: Joaquin Espino    Accepting Facility: [facility]  RN call to report: [phone]  [facility] p: [phone]   time: 1:30PM    Sharon Ferreira, [facility] [place] [facility]   [phone]

## 2022-06-03 NOTE — PROGRESS NOTES
Report called to Lizzeth Purcell RN at [facility]. Belongings at bedside sent with pt, and all other personal belongings sent with pt's family.

## 2022-06-03 NOTE — PROGRESS NOTES
Bedside and Verbal shift change report given to [facility] (oncoming nurse) by Gurjit Purdy (offgoing nurse). Report included the following information SBAR, Kardex, Intake/Output, MAR and Recent Results.

## 2022-06-03 NOTE — DISCHARGE SUMMARY
Discharge Summary       PATIENT ID: Domitila Meek  MRN: [id number]   YOB: 1935    DATE OF ADMISSION: 5/28/2022  4:14 PM    DATE OF DISCHARGE: 06/03/22     PRIMARY CARE PROVIDER: Koko Sinclair MD     DISCHARGING PROVIDER: Amil Snellen, MD    To contact this individual call [phone] and ask the  to page. If unavailable ask to be transferred the Adult Hospitalist Department. CONSULTATIONS: IP CONSULT TO HOSPITALIST  IP CONSULT TO GENERAL SURGERY    PROCEDURES/SURGERIES: * No surgery found *    DISCHARGE DIAGNOSES:   UGIB  Acute blood loss anemia  JOSE F  R lower extremity hematoma   Hypernatremia   Pneumonia - completed treatment   Dementia  Diabetes  HTN          ADMISSION SUMMARY AND HOSPITAL COURSE:   Alejandro Rhodes a 82 y. o. female with past medical history of anxiety, Alzheimer's dementia, hypertension, recent hip replacement right hip on 5/12/2022 at [facility], diabetes, UTI, being treated for pneumonia through a PICC line with vancomycin and Zosyn at inpatient rehab. Ella Saini comes from [facility] rehab. Viridiana Gray was here earlier this morning when they found her to have a substantially sized blood clot in her diaper.  She was sent from [facility] due to concern for GI bleed.  While here in the ER patient was hemodynamically stable and there were no signs of bleeding so she was monitored and sent back to rehab.  Apparently later in the evening the patient presented with another blood clot in her diaper, so the people at [facility] rehab sent her back due to concerns. Lyndsey Lira hemoglobin has dropped from about 9.5 to about 9 since the morning.  Patient is minimally participative and responsive which is near her baseline from her dementia, but family says that she is more participative usually. T[place]u do report that she was kept at [facility] for about 2 weeks and required 2 different transfusions while there. [employer] also report that when she was discharged from [facility] she was discharged without a blood thinner but that she had been started on a blood thinner for apparent DVT prophylaxis at [facility].  I am unable to get a review of systems from the patient due to her not responding to my questions.     Pt was admitted and closely monitored for GI bleed. Hemoglobin remained stable with initiation of IV PPI, and holding AC. Patient was also being treated for acute pneumonia at rehab had a PICC line placed. She completed antibiotics, PICC line will be removed prior to discharge. Patient presented with acute kidney injury which has now resolved after IV fluids. Patient was found to have a right lower extremity hematoma with some central necrosis. Underwent bedside I&D 6/3 by Dr. Lennie Corbin, and will now require local wound care. Advised family that she is at high risk of infection in that area, and at that point they may need to proceed with surgery. General surgery had discussed taking the patient to the operating room for I&D, however family refused preferring to avoid general anesthesia at this time. Patient stable for discharge back to rehab at this time.         DISCHARGE DIAGNOSES / PLAN:      UGIB - stable.  No evidence of active bleeding since admission    Acute blood loss anemia   - Transfuse if hemoglobin less than 7  - Continue to monitor, has not needed any PRBC transfusions.   - GI consulted, and no plans for intervention   - PPI BID IV --> to PO today and on discharge      Right lower extremity hematoma with central necrosis   - General surgery is on board, family declining OR   - s/p bedside I and D by Dr. Lennie Corbin 6/3, Right lower leg- Daily cleanse with normal saline, wipe wound bed clean including areas of undermining, loosely fill with roll gauze that has been moistened with normal saline, cover with 4 x 4's and ABD pad, secure with roll gauze and tape.      JOSE F - Cr up to 1.6 on admit, now improved  - DC IVF    - BMP daily      Hypernatremia - improved  - DC IVF and monitor      Pneumonia with right sided PICC - completed treatment   - PICC to be removed on discharge      Alzheimer's dementia  Stable     Diabetes - A1c 7.2%  - SSI, POCT glucose checks and hypoglycemia protocol      Hypertension - BP stable, may need to resume oral meds tomorrow   Hypokalemia - replete and monitor   BMI: Body mass index is 31.92 kg/m². . This patient: Meets criteria for obesity given BMI >/= 30 and < 40 due to excess calories/nutritional. Weight loss and lifestyle modifications should be encouraged as an outpatient. PENDING TEST RESULTS:   At the time of discharge the following test results are still pending: None     ADDITIONAL CARE RECOMMENDATIONS:   - Please take all medications as prescribed. Note changes as below. **Start pantoprazole 40mg BID   **Hold your aspirin   - Please make sure to follow up with your primary care physician within 1-2 weeks of discharge for hospital follow up. You should also follow up with orthopedics   - Please make sure to continue to monitor for: Chest pain, shortness of breath, high fevers,  and return to the Emergency Department with any of these symptoms.   - Please get up slowly from a seated or laying position, avoid falls. - Avoid tobacco, alcohol and other illicit drug use. - Diet restrictions: Regular   - Activity restrictions: previous orthopedic restrictions   - Wound care:    Daily cleanse with normal saline, wipe wound bed clean including areas of undermining, loosely fill with roll gauze that has been moistened with normal saline, cover with 4 x 4's and ABD pad, secure with roll gauze and tape. NOTIFY YOUR PHYSICIAN FOR ANY OF THE FOLLOWING:   Fever over 101 degrees for 24 hours. Chest pain, shortness of breath, fever, chills, nausea, vomiting, diarrhea, change in mentation, falling, weakness, bleeding.  Severe pain or pain not relieved by medications, as well as any other signs or symptoms that you may have questions about.    FOLLOW UP APPOINTMENTS:    Follow-up Information     Follow up With Specialties Details Why [address] [facility]   [facility]    Niyah Augustin MD Internal Medicine Physician   [address]  [phone]               DIET: Resume previous diet    ACTIVITY: Activity as tolerated and See surgical instructions    EQUIPMENT needed: Per PT/OT    Wound care:   Right lower leg- Daily cleanse with normal saline, wipe wound bed clean including areas of undermining, loosely fill with roll gauze that has been moistened with normal saline, cover with 4 x 4's and ABD pad, secure with roll gauze and tape. DISCHARGE MEDICATIONS:  Current Discharge Medication List      START taking these medications    Details   pantoprazole (PROTONIX) 40 mg tablet Take 1 Tablet by mouth Before breakfast and dinner for 60 doses. Qty: 60 Tablet, Refills: 0  Start date: 6/3/2022, End date: 7/3/2022         CONTINUE these medications which have NOT CHANGED    Details   metFORMIN (GLUCOPHAGE) 500 mg tablet       rivastigmine tartrate (EXELON) 6 mg capsule 6 mg.      acetaminophen (TYLENOL) 325 mg tablet 650 mg. amLODIPine (NORVASC) 10 mg tablet TAKE ONE TABLET BY MOUTH ONCE DAILY      atorvastatin (LIPITOR) 10 mg tablet TAKE ONE TABLET BY MOUTH EVERY NIGHT AT BEDTIME      carvedilol (COREG) 25 mg tablet TAKE ONE-HALF TABLET BY MOUTH TWICE DAILY      Insulin Lisp & Lisp Prot, Hum, (HUMALOG MIX 75-25 KWIKPEN) 100 unit/mL (75-25) inpn 10 Units.          STOP taking these medications       aspirin delayed-release 81 mg tablet Comments:   Reason for Stopping:         doxepin (SINEQUAN) 25 mg capsule Comments:   Reason for Stopping:         hydrALAZINE (APRESOLINE) 50 mg tablet Comments:   Reason for Stopping:         omeprazole (PRILOSEC) 20 mg capsule Comments:   Reason for Stopping: oxyCODONE-acetaminophen (PERCOCET) 5-325 mg per tablet Comments:   Reason for Stopping:               DISPOSITION:    Home With:   OT  PT  HH  RN      X Long term SNF/Inpatient Rehab    Independent/assisted living    Hospice    Other:       PATIENT CONDITION AT DISCHARGE:     Functional status   X Poor     Deconditioned     Independent      Cognition     Lucid     Forgetful    X Dementia      Catheters/lines (plus indication)    Martin     PICC     PEG    X None      Code status     Full code    X DNR      PHYSICAL EXAMINATION AT DISCHARGE:  Visit Vitals  BP (!) 148/80   Pulse 73   Temp 97.6 °F (36.4 °C)   Resp 16   Ht 5' 6\" (1.676 m)   Wt 89.7 kg (197 lb 12 oz)   SpO2 94%   BMI 31.92 kg/m²     Gen: NAD, awake in bed, chronically ill appearing   HEENT: NC/AT, sclera anicteric, PERRL, EOMI  CV: RRR no m/r/g, normal S1 and S2, no pedal edema   Resp: CTA b/l no increased work of breathing, no wheezing or rhonchi, speaking in full sentences   Abd: NT/ND, normal bowel sounds, no rebound or guarding  Ext: 2+ pulses, large hematoma over the L upper extremity, and wound bandaged over the LLE   Skin: No rashes or lesions      CHRONIC MEDICAL DIAGNOSES:  Problem List as of 6/3/2022 Date Reviewed: 3/6/2019          Codes Class Noted - Resolved    Pneumonia ICD-10-CM: J18.9  ICD-9-CM: 486  5/30/2022 - Present        JOSE F (acute kidney injury) ([facility] ICD-10-CM: N17.9  ICD-9-CM: 584.9  5/30/2022 - Present        GIB (gastrointestinal bleeding) ICD-10-CM: K92.2  ICD-9-CM: 578.9  5/28/2022 - Present        GERD (gastroesophageal reflux disease) ICD-10-CM: K21.9  ICD-9-CM: 530.81  3/6/2019 - Present        Gait abnormality ICD-10-CM: R26.9  ICD-9-CM: 781.2  2/25/2019 - Present        Chronic diastolic (congestive) heart failure ([facility] ICD-10-CM: I50.32  ICD-9-CM: 428.32, 428.0  2/4/2019 - Present    Overview Signed 3/6/2019  3:46 PM by Stephanie WEBER     Last Assessment & Plan:   Significant edema to her thighs, clear lungs and no significant dyspnea. This may be predominantly right heart and venous insufficiency. Will start her on lasix with spironolactone support for potassium, repeat echo and follow closely. She was advised to wear compression stockings. Recurrent UTI ICD-10-CM: N39.0  ICD-9-CM: 599.0  8/30/2017 - Present        Late onset Alzheimer's disease without behavioral disturbance ([facility] ICD-10-CM: G30.1, F02.80  ICD-9-CM: 331.0, 294.10  5/22/2017 - Present    Overview Signed 3/6/2019  3:46 PM by Homero, [address] [place] [address]:   Psychological condition is unchanged. Continue current treatment regimen. Psychological condition  will be reassessed at the next regular appointment. MOCA today.               Structural abnormality of bladder ICD-10-CM: Q64.79  ICD-9-CM: 753.8  3/8/2017 - Present        Vaginal discomfort ICD-10-CM: N94.9  ICD-9-CM: 625.9  3/8/2017 - Present        Urinary tract infection without hematuria ICD-10-CM: N39.0  ICD-9-CM: 599.0  1/19/2017 - Present        Dysuria ICD-10-CM: R30.0  ICD-9-CM: 788.1  11/30/2016 - Present        Confusion ICD-10-CM: R41.0  ICD-9-CM: 298.9  11/30/2016 - Present        Carotid artery disease ([facility] ICD-10-CM: I77.9  ICD-9-CM: 447.9  11/21/2016 - Present    Overview Signed 11/21/2016  9:53 AM by Rocío De La Cruz     Overview:   right endarterectomy             Osteoarthritis ICD-10-CM: M19.90  ICD-9-CM: 715.90  11/21/2016 - Present        Spinal stenosis ICD-10-CM: M48.00  ICD-9-CM: 724.00  11/21/2016 - Present        Unspecified fracture of unspecified wrist and hand, sequela ICD-10-CM: S62.90XS  ICD-9-CM: Lanetta Osier  10/18/2016 - Present        Dementia ([facility] ICD-10-CM: F03.90  ICD-9-CM: 294.20  10/18/2016 - Present        Dementia without behavioral disturbance ([facility]) ICD-10-CM: F03.90  ICD-9-CM: 294.20  10/18/2016 - Present        Long term current use of opiate analgesic ICD-10-CM: Z79.891  ICD-9-CM: V58.69  5/19/2016 - Present        Chronic bilateral low back pain ICD-10-CM: M54.50, G89.29  ICD-9-CM: 724.2, 338.29  4/23/2015 - Present    Overview Signed 3/6/2019  3:46 PM by Homero, [address] [place] [address]:   Hx. Of spinal stenosis. CLBP persists. She has been seen Kip Dominguez in the past with epidurals. Limited benefit. She has been using a cane. She does not want to use the walker. Encourage the use of her walker. Fall prevention discussed. Spondylosis of lumbosacral region without myelopathy or radiculopathy ICD-10-CM: M47.817  ICD-9-CM: 721.3  10/1/2014 - Present        Spinal stenosis of lumbar region ICD-10-CM: M48.061  ICD-9-CM: 724.02  8/13/2014 - Present    Overview Signed 3/6/2019  3:46 PM by Wyatt WEBER     Last Assessment & Plan:   Chronic lower back pain. Will refer to PT for evaluation and treatment. Edema ICD-10-CM: R60.9  ICD-9-CM: 782.3  3/7/2014 - Present    Overview Signed 3/6/2019  3:46 PM by Wyatt WEBER     Last Assessment & Plan:   Significant BLE edema. Has previously seen Dr. Jb Johnson. She has support hose but is not wearing them today. She has a murmur. Family would like to see Cardiology. Stenosis of right carotid artery ICD-10-CM: I65.21  ICD-9-CM: 433.10  4/9/2013 - Present    Overview Signed 3/6/2019  3:46 PM by Edgardo Henry     Last Assessment & Plan:   Followed by Dr. Roderick Mujica. She is taking ASA daily 81mg.               Benign essential hypertension ICD-10-CM: I10  ICD-9-CM: 401.1  2/11/2013 - Present        Hypercholesterolemia ICD-10-CM: E78.00  ICD-9-CM: 272.0  11/28/2012 - Present        Type 2 diabetes mellitus ([facility] ICD-10-CM: E11.9  ICD-9-CM: 250.00  11/28/2012 - Present        Memory loss ICD-10-CM: R41.3  ICD-9-CM: 780.93  11/27/2012 - Present        RESOLVED: Gastroesophageal reflux disease ICD-10-CM: K21.9  ICD-9-CM: 530.81  11/21/2016 - 8/3/2021              Greater than 30 minutes were spent with the patient on counseling and coordination of care    Signed:   Roma Carrera MD  6/3/2022  11:05 AM

## 2023-05-29 RX ORDER — AMLODIPINE BESYLATE 10 MG/1
1 TABLET ORAL DAILY
COMMUNITY
Start: 2016-02-23

## 2023-05-29 RX ORDER — INSULIN LISPRO 100 [IU]/ML
10 INJECTION, SUSPENSION SUBCUTANEOUS
COMMUNITY
Start: 2016-03-23

## 2023-05-29 RX ORDER — ATORVASTATIN CALCIUM 10 MG/1
1 TABLET, FILM COATED ORAL NIGHTLY
COMMUNITY
Start: 2015-12-28

## 2023-05-29 RX ORDER — CARVEDILOL 25 MG/1
TABLET ORAL
COMMUNITY
Start: 2016-04-11

## 2023-05-29 RX ORDER — RIVASTIGMINE TARTRATE 6 MG/1
6 CAPSULE ORAL
COMMUNITY
Start: 2017-01-20

## 2023-05-29 RX ORDER — ACETAMINOPHEN 325 MG/1
650 TABLET ORAL
COMMUNITY
Start: 2016-10-18